# Patient Record
Sex: MALE | Race: WHITE | NOT HISPANIC OR LATINO | Employment: FULL TIME | URBAN - METROPOLITAN AREA
[De-identification: names, ages, dates, MRNs, and addresses within clinical notes are randomized per-mention and may not be internally consistent; named-entity substitution may affect disease eponyms.]

---

## 2019-11-07 ENCOUNTER — OFFICE VISIT (OUTPATIENT)
Dept: FAMILY MEDICINE CLINIC | Facility: CLINIC | Age: 24
End: 2019-11-07
Payer: COMMERCIAL

## 2019-11-07 VITALS
DIASTOLIC BLOOD PRESSURE: 84 MMHG | HEIGHT: 69 IN | BODY MASS INDEX: 34.66 KG/M2 | HEART RATE: 88 BPM | TEMPERATURE: 98.4 F | SYSTOLIC BLOOD PRESSURE: 150 MMHG | WEIGHT: 234 LBS

## 2019-11-07 DIAGNOSIS — Z13.220 ENCOUNTER FOR SCREENING FOR LIPID DISORDER: ICD-10-CM

## 2019-11-07 DIAGNOSIS — R03.0 ELEVATED BP WITHOUT DIAGNOSIS OF HYPERTENSION: ICD-10-CM

## 2019-11-07 DIAGNOSIS — Z71.6 ENCOUNTER FOR SMOKING CESSATION COUNSELING: ICD-10-CM

## 2019-11-07 DIAGNOSIS — J45.909 UNCOMPLICATED ASTHMA, UNSPECIFIED ASTHMA SEVERITY, UNSPECIFIED WHETHER PERSISTENT: Primary | ICD-10-CM

## 2019-11-07 DIAGNOSIS — Z00.00 WELL ADULT EXAM: ICD-10-CM

## 2019-11-07 PROCEDURE — 99203 OFFICE O/P NEW LOW 30 MIN: CPT | Performed by: FAMILY MEDICINE

## 2019-11-07 RX ORDER — VARENICLINE TARTRATE 25 MG
KIT ORAL
Qty: 53 TABLET | Refills: 0 | Status: SHIPPED | OUTPATIENT
Start: 2019-11-07 | End: 2020-03-02

## 2019-11-07 RX ORDER — ALBUTEROL SULFATE 90 UG/1
2 AEROSOL, METERED RESPIRATORY (INHALATION) EVERY 6 HOURS PRN
Qty: 3 INHALER | Refills: 3 | Status: SHIPPED | OUTPATIENT
Start: 2019-11-07

## 2019-11-07 NOTE — PROGRESS NOTES
BMI Counseling: Body mass index is 35 06 kg/m²  The BMI is above normal  Nutrition recommendations include decreasing portion sizes, encouraging healthy choices of fruits and vegetables, decreasing fast food intake, consuming healthier snacks, limiting drinks that contain sugar, moderation in carbohydrate intake, increasing intake of lean protein, reducing intake of saturated and trans fat and reducing intake of cholesterol  Exercise recommendations include moderate physical activity 150 minutes/week  No pharmacotherapy was ordered  Tobacco Cessation Counseling: Tobacco cessation counseling was provided  The patient is sincerely urged to quit consumption of tobacco  He is ready to quit tobacco  Medication options and side effects of medication discussed  Patient agreed to medication  Varenicline (chantix) was prescribed  Assessment/Plan:     Chronic Problems:  No problem-specific Assessment & Plan notes found for this encounter  Visit Diagnosis:  Diagnoses and all orders for this visit:    Uncomplicated asthma, unspecified asthma severity, unspecified whether persistent  -     Complete PFT with post bronchodilator; Future  -     albuterol (PROVENTIL HFA,VENTOLIN HFA) 90 mcg/act inhaler; Inhale 2 puffs every 6 (six) hours as needed for wheezing or shortness of breath    Encounter for smoking cessation counseling  -     varenicline (CHANTIX JOHNNIE) 0 5 MG X 11 & 1 MG X 42 tablet; Take one 0 5mg tab by mouth 1x daily for 3 days, then increase to one 0 5mg tab 2x daily for 3 days, then increase to one 1mg tab 2x daily    Elevated BP without diagnosis of hypertension  -     Comprehensive metabolic panel; Future  -     TSH, 3rd generation; Future    Well adult exam  -     Comprehensive metabolic panel; Future  -     TSH, 3rd generation; Future  -     Lipid panel; Future    Encounter for screening for lipid disorder  -     Lipid panel;  Future     Elevated blood pressure no previous history of hypertension  Discussed values, reviewed goals of blood pressure, encourage low salt sodium diet and weight loss program, encouraged to monitor home blood  Smoking cessation  Discussed plan of care, reviewed medications dosing and timing, reviewed side effect profile, discussed setting quit date 1 week after start medication  Call for any issues concerns  Asthma  Discussed step plan care, has not had recent pulmonary function test recommended in schedule      Subjective:    Patient ID: Vick Bazan is a 25 y o  male  Establish     Here for pe     No new problem s  But has been having a bit of cough has not been using inhaler because I do not have 1  Diet is poor most of the days but does go to the gym  Would like to have thyroid checked  N would like to have prescription for Chantix again for smoking, has tried in the past found it effective  Negative chest pain palpitations shortness of breath difficulty breathing cough lesions rash        Past meds   Asthma   Has not required any inhaler in a number of yrs   shx    none   fam hx  Father 62 yr , a/w  Mother 55yr  Thyroid   Brother older? Soc hx   Mother lives with me   Single   Smoke + 3/4 ppd x 8 yr   [de-identified] -  etoh + occassionally    [employer]   [employer] and [employer]    meds  rx-  Supplement-        The following portions of the patient's history were reviewed and updated as appropriate: allergies, current medications, past family history, past medical history, past social history, past surgical history and problem list     Review of Systems   Constitutional: Negative for appetite change, chills, fever and unexpected weight change  HENT: Negative for congestion, dental problem, ear pain, hearing loss, postnasal drip, rhinorrhea, sinus pressure, sinus pain, sneezing, sore throat, tinnitus and voice change  Eyes: Negative for visual disturbance  Respiratory: Negative for apnea, cough, chest tightness and shortness of breath  Cardiovascular: Negative for chest pain, palpitations and leg swelling  Gastrointestinal: Negative for abdominal pain, blood in stool, constipation, diarrhea, nausea and vomiting  Endocrine: Negative for cold intolerance, heat intolerance, polydipsia, polyphagia and polyuria  Genitourinary: Negative for decreased urine volume, difficulty urinating, dysuria, frequency and hematuria  Musculoskeletal: Negative for arthralgias, back pain, gait problem, joint swelling and myalgias  Skin: Negative for color change, rash and wound  Allergic/Immunologic: Negative for environmental allergies and food allergies  Neurological: Negative for dizziness, syncope, weakness, light-headedness, numbness and headaches  Hematological: Negative for adenopathy  Does not bruise/bleed easily  Psychiatric/Behavioral: Negative for sleep disturbance and suicidal ideas  The patient is not nervous/anxious            /84   Pulse 88   Temp 98 4 °F (36 9 °C)   Ht 5' 8 5" (1 74 m)   Wt 106 kg (234 lb)   BMI 35 06 kg/m²   Social History     Socioeconomic History    Marital status: Single     Spouse name: Not on file    Number of children: Not on file    Years of education: Not on file    Highest education level: Not on file   Occupational History    Not on file   Social Needs    Financial resource strain: Not on file    Food insecurity:     Worry: Not on file     Inability: Not on file    Transportation needs:     Medical: Not on file     Non-medical: Not on file   Tobacco Use    Smoking status: Current Every Day Smoker     Types: Cigarettes    Smokeless tobacco: Never Used   Substance and Sexual Activity    Alcohol use: Yes     Comment: Occasionally     Drug use: Not on file    Sexual activity: Not on file   Lifestyle    Physical activity:     Days per week: Not on file     Minutes per session: Not on file    Stress: Not on file   Relationships    Social connections:     Talks on phone: Not on file Gets together: Not on file     Attends Druze service: Not on file     Active member of club or organization: Not on file     Attends meetings of clubs or organizations: Not on file     Relationship status: Not on file    Intimate partner violence:     Fear of current or ex partner: Not on file     Emotionally abused: Not on file     Physically abused: Not on file     Forced sexual activity: Not on file   Other Topics Concern    Not on file   Social History Narrative    Always uses seat belt    Daily caffeinated coffee consumption    Employed    Exercises regularly    Seeing a dentist     No past medical history on file  No family history on file  No past surgical history on file  Current Outpatient Medications:     albuterol (PROVENTIL HFA,VENTOLIN HFA) 90 mcg/act inhaler, Inhale 2 puffs every 6 (six) hours as needed for wheezing or shortness of breath, Disp: 3 Inhaler, Rfl: 3    varenicline (CHANTIX JOHNNIE) 0 5 MG X 11 & 1 MG X 42 tablet, Take one 0 5mg tab by mouth 1x daily for 3 days, then increase to one 0 5mg tab 2x daily for 3 days, then increase to one 1mg tab 2x daily, Disp: 53 tablet, Rfl: 0    No Known Allergies       Lab Review   No visits with results within 6 Month(s) from this visit  Latest known visit with results is:   No results found for any previous visit  Imaging: No results found  Objective:     Physical Exam   Constitutional: He is oriented to person, place, and time  He appears well-developed and well-nourished  No distress  HENT:   Head: Normocephalic and atraumatic  Right Ear: External ear normal    Left Ear: External ear normal    Mouth/Throat: Oropharynx is clear and moist    Eyes: EOM are normal    Neck: Normal range of motion  Neck supple  Cardiovascular: Normal rate, regular rhythm and normal heart sounds  Pulmonary/Chest: Effort normal and breath sounds normal  He has no wheezes  Abdominal: Soft   Bowel sounds are normal    Musculoskeletal: Normal range of motion  Neurological: He is alert and oriented to person, place, and time  He has normal reflexes  Skin: Skin is warm and dry  Psychiatric: He has a normal mood and affect  His behavior is normal  Judgment and thought content normal          There are no Patient Instructions on file for this visit  CHERYL Sellers    Portions of the record may have been created with voice recognition software  Occasional wrong word or "sound a like" substitutions may have occurred due to the inherent limitations of voice recognition software  Read the chart carefully and recognize, using context, where substitutions have occurred

## 2019-11-22 ENCOUNTER — APPOINTMENT (OUTPATIENT)
Dept: LAB | Facility: HOSPITAL | Age: 24
End: 2019-11-22
Payer: COMMERCIAL

## 2019-11-22 DIAGNOSIS — Z00.00 WELL ADULT EXAM: ICD-10-CM

## 2019-11-22 DIAGNOSIS — Z13.220 ENCOUNTER FOR SCREENING FOR LIPID DISORDER: ICD-10-CM

## 2019-11-22 DIAGNOSIS — R03.0 ELEVATED BP WITHOUT DIAGNOSIS OF HYPERTENSION: ICD-10-CM

## 2019-11-22 LAB
ALBUMIN SERPL BCP-MCNC: 3.9 G/DL (ref 3.5–5)
ALP SERPL-CCNC: 56 U/L (ref 46–116)
ALT SERPL W P-5'-P-CCNC: 27 U/L (ref 12–78)
ANION GAP SERPL CALCULATED.3IONS-SCNC: 8 MMOL/L (ref 4–13)
AST SERPL W P-5'-P-CCNC: 19 U/L (ref 5–45)
BILIRUB SERPL-MCNC: 0.6 MG/DL (ref 0.2–1)
BUN SERPL-MCNC: 10 MG/DL (ref 5–25)
CALCIUM SERPL-MCNC: 9.2 MG/DL (ref 8.3–10.1)
CHLORIDE SERPL-SCNC: 106 MMOL/L (ref 100–108)
CHOLEST SERPL-MCNC: 192 MG/DL (ref 50–200)
CO2 SERPL-SCNC: 29 MMOL/L (ref 21–32)
CREAT SERPL-MCNC: 0.97 MG/DL (ref 0.6–1.3)
GFR SERPL CREATININE-BSD FRML MDRD: 109 ML/MIN/1.73SQ M
GLUCOSE P FAST SERPL-MCNC: 95 MG/DL (ref 65–99)
HDLC SERPL-MCNC: 52 MG/DL
LDLC SERPL CALC-MCNC: 130 MG/DL (ref 0–100)
NONHDLC SERPL-MCNC: 140 MG/DL
POTASSIUM SERPL-SCNC: 4.5 MMOL/L (ref 3.5–5.3)
PROT SERPL-MCNC: 7.1 G/DL (ref 6.4–8.2)
SODIUM SERPL-SCNC: 143 MMOL/L (ref 136–145)
TRIGL SERPL-MCNC: 50 MG/DL
TSH SERPL DL<=0.05 MIU/L-ACNC: 2.02 UIU/ML (ref 0.36–3.74)

## 2019-11-22 PROCEDURE — 36415 COLL VENOUS BLD VENIPUNCTURE: CPT

## 2019-11-22 PROCEDURE — 84443 ASSAY THYROID STIM HORMONE: CPT

## 2019-11-22 PROCEDURE — 80061 LIPID PANEL: CPT

## 2019-11-22 PROCEDURE — 80053 COMPREHEN METABOLIC PANEL: CPT

## 2019-12-09 ENCOUNTER — OFFICE VISIT (OUTPATIENT)
Dept: FAMILY MEDICINE CLINIC | Facility: CLINIC | Age: 24
End: 2019-12-09
Payer: COMMERCIAL

## 2019-12-09 VITALS
TEMPERATURE: 98.4 F | SYSTOLIC BLOOD PRESSURE: 130 MMHG | DIASTOLIC BLOOD PRESSURE: 68 MMHG | WEIGHT: 230 LBS | HEART RATE: 71 BPM | OXYGEN SATURATION: 95 % | HEIGHT: 69 IN | RESPIRATION RATE: 12 BRPM | BODY MASS INDEX: 34.07 KG/M2

## 2019-12-09 DIAGNOSIS — F17.200 SMOKING: ICD-10-CM

## 2019-12-09 DIAGNOSIS — E78.2 MIXED HYPERLIPIDEMIA: Primary | ICD-10-CM

## 2019-12-09 DIAGNOSIS — Z11.4 SCREENING FOR HIV WITHOUT PRESENCE OF RISK FACTORS: ICD-10-CM

## 2019-12-09 PROCEDURE — 99214 OFFICE O/P EST MOD 30 MIN: CPT | Performed by: FAMILY MEDICINE

## 2019-12-09 PROCEDURE — 4004F PT TOBACCO SCREEN RCVD TLK: CPT | Performed by: FAMILY MEDICINE

## 2019-12-09 PROCEDURE — 3008F BODY MASS INDEX DOCD: CPT | Performed by: FAMILY MEDICINE

## 2019-12-09 NOTE — PROGRESS NOTES
Depression Screening and Follow-up Plan: Clincally patient does not have depression  No treatment is required  Tobacco Cessation Counseling: Tobacco cessation counseling was provided  The patient is sincerely urged to quit consumption of tobacco  He is not ready to quit tobacco  Medication options discussed  Assessment/Plan:     Chronic Problems:  No problem-specific Assessment & Plan notes found for this encounter  Visit Diagnosis:  Diagnoses and all orders for this visit:    Mixed hyperlipidemia  -     Lipid panel; Future  -     Comprehensive metabolic panel; Future    Smoking    Screening for HIV without presence of risk factors  -     HIV 1/2 AG-AB combo; Future       hyperlipidemia   discussed care at length discussed dietary modifications, encouraged increasing fiber consider Omega threes, decrease fast foods, encouraged more aerobic exercise continue gym   recheck 6 months   S smoking cessation   discussed at length alternatives potential medications, long-term benefits of nonsmoking    Subjective:    Patient ID: Galen Capone is a 25 y o  male  Here to follow up on blood work   denies any current problems  Cholesterol has not been watching diet, regularly goes to the gym   currently on no medications   Has not consider any dietary modifications   alcohol   smoking continues to smoke packs per day   not ready to quit  Negative chest pain palpitations shortness of breath difficulty breathing cough lesions rash        The following portions of the patient's history were reviewed and updated as appropriate: allergies, current medications, past family history, past medical history, past social history, past surgical history and problem list     Review of Systems   Constitutional: Negative for appetite change, chills, fever and unexpected weight change     HENT: Negative for congestion, dental problem, ear pain, hearing loss, postnasal drip, rhinorrhea, sinus pressure, sinus pain, sneezing, sore throat, tinnitus and voice change  Eyes: Negative for visual disturbance  Respiratory: Negative for apnea, cough, chest tightness and shortness of breath  Cardiovascular: Negative for chest pain, palpitations and leg swelling  Gastrointestinal: Negative for abdominal pain, blood in stool, constipation, diarrhea, nausea and vomiting  Endocrine: Negative for cold intolerance, heat intolerance, polydipsia, polyphagia and polyuria  Genitourinary: Negative for decreased urine volume, difficulty urinating, dysuria, frequency and hematuria  Musculoskeletal: Negative for arthralgias, back pain, gait problem, joint swelling and myalgias  Skin: Negative for color change, rash and wound  Allergic/Immunologic: Negative for environmental allergies and food allergies  Neurological: Negative for dizziness, syncope, weakness, light-headedness, numbness and headaches  Hematological: Negative for adenopathy  Does not bruise/bleed easily  Psychiatric/Behavioral: Negative for sleep disturbance and suicidal ideas  The patient is not nervous/anxious            /68   Pulse 71   Temp 98 4 °F (36 9 °C)   Resp 12   Ht 5' 8 5" (1 74 m)   Wt 104 kg (230 lb)   SpO2 95%   BMI 34 46 kg/m²   Social History     Socioeconomic History    Marital status: Single     Spouse name: Not on file    Number of children: Not on file    Years of education: Not on file    Highest education level: Not on file   Occupational History    Not on file   Social Needs    Financial resource strain: Not on file    Food insecurity:     Worry: Not on file     Inability: Not on file    Transportation needs:     Medical: Not on file     Non-medical: Not on file   Tobacco Use    Smoking status: Current Every Day Smoker     Types: Cigarettes    Smokeless tobacco: Never Used   Substance and Sexual Activity    Alcohol use: Yes     Comment: Occasionally     Drug use: Not on file    Sexual activity: Not on file   Lifestyle    Physical activity:     Days per week: Not on file     Minutes per session: Not on file    Stress: Not on file   Relationships    Social connections:     Talks on phone: Not on file     Gets together: Not on file     Attends Taoism service: Not on file     Active member of club or organization: Not on file     Attends meetings of clubs or organizations: Not on file     Relationship status: Not on file    Intimate partner violence:     Fear of current or ex partner: Not on file     Emotionally abused: Not on file     Physically abused: Not on file     Forced sexual activity: Not on file   Other Topics Concern    Not on file   Social History Narrative    Always uses seat belt    Daily caffeinated coffee consumption    Employed    Exercises regularly    Seeing a dentist     No past medical history on file  No family history on file  No past surgical history on file      Current Outpatient Medications:     albuterol (PROVENTIL HFA,VENTOLIN HFA) 90 mcg/act inhaler, Inhale 2 puffs every 6 (six) hours as needed for wheezing or shortness of breath, Disp: 3 Inhaler, Rfl: 3    varenicline (CHANTIX JOHNNIE) 0 5 MG X 11 & 1 MG X 42 tablet, Take one 0 5mg tab by mouth 1x daily for 3 days, then increase to one 0 5mg tab 2x daily for 3 days, then increase to one 1mg tab 2x daily (Patient not taking: Reported on 12/9/2019), Disp: 53 tablet, Rfl: 0    No Known Allergies       Lab Review   Appointment on 11/22/2019   Component Date Value    Sodium 11/22/2019 143     Potassium 11/22/2019 4 5     Chloride 11/22/2019 106     CO2 11/22/2019 29     ANION GAP 11/22/2019 8     BUN 11/22/2019 10     Creatinine 11/22/2019 0 97     Glucose, Fasting 11/22/2019 95     Calcium 11/22/2019 9 2     AST 11/22/2019 19     ALT 11/22/2019 27     Alkaline Phosphatase 11/22/2019 56     Total Protein 11/22/2019 7 1     Albumin 11/22/2019 3 9     Total Bilirubin 11/22/2019 0 60     eGFR 11/22/2019 109     TSH 3RD GENERATON 11/22/2019 2 024     Cholesterol 11/22/2019 192     Triglycerides 11/22/2019 50     HDL, Direct 11/22/2019 52     LDL Calculated 11/22/2019 130*    Non-HDL-Chol (CHOL-HDL) 11/22/2019 140         Imaging: No results found  Objective:     Physical Exam   Constitutional: He is oriented to person, place, and time  He appears well-developed and well-nourished  No distress  HENT:   Head: Normocephalic and atraumatic  Right Ear: External ear normal    Left Ear: External ear normal    Mouth/Throat: Oropharynx is clear and moist  No oropharyngeal exudate  Eyes: Conjunctivae are normal    Neck: Normal range of motion  Neck supple  Cardiovascular: Normal rate, regular rhythm and normal heart sounds  Pulmonary/Chest: Effort normal and breath sounds normal    Musculoskeletal: Normal range of motion  Lymphadenopathy:     He has no cervical adenopathy  Neurological: He is alert and oriented to person, place, and time  He has normal reflexes  Skin: Skin is warm and dry  Psychiatric: He has a normal mood and affect  His behavior is normal  Judgment and thought content normal          There are no Patient Instructions on file for this visit  CHERYL Carlisle    Portions of the record may have been created with voice recognition software  Occasional wrong word or "sound a like" substitutions may have occurred due to the inherent limitations of voice recognition software  Read the chart carefully and recognize, using context, where substitutions have occurred

## 2020-03-01 ENCOUNTER — HOSPITAL ENCOUNTER (EMERGENCY)
Facility: HOSPITAL | Age: 25
Discharge: HOME | End: 2020-03-01
Attending: EMERGENCY MEDICINE | Admitting: EMERGENCY MEDICINE
Payer: COMMERCIAL

## 2020-03-01 ENCOUNTER — APPOINTMENT (EMERGENCY)
Dept: RADIOLOGY | Facility: HOSPITAL | Age: 25
End: 2020-03-01
Payer: COMMERCIAL

## 2020-03-01 VITALS
RESPIRATION RATE: 16 BRPM | TEMPERATURE: 97.9 F | WEIGHT: 229.28 LBS | SYSTOLIC BLOOD PRESSURE: 141 MMHG | BODY MASS INDEX: 34.35 KG/M2 | DIASTOLIC BLOOD PRESSURE: 79 MMHG | OXYGEN SATURATION: 99 % | HEART RATE: 71 BPM

## 2020-03-01 DIAGNOSIS — S52.502A FRACTURE OF RADIUS AND ULNA, DISTAL, LEFT, CLOSED, INITIAL ENCOUNTER: Primary | ICD-10-CM

## 2020-03-01 DIAGNOSIS — S52.602A FRACTURE OF RADIUS AND ULNA, DISTAL, LEFT, CLOSED, INITIAL ENCOUNTER: Primary | ICD-10-CM

## 2020-03-01 PROCEDURE — 99283 EMERGENCY DEPT VISIT LOW MDM: CPT

## 2020-03-01 PROCEDURE — 99284 EMERGENCY DEPT VISIT MOD MDM: CPT | Performed by: PHYSICIAN ASSISTANT

## 2020-03-01 PROCEDURE — 73110 X-RAY EXAM OF WRIST: CPT

## 2020-03-01 PROCEDURE — 29125 APPL SHORT ARM SPLINT STATIC: CPT | Performed by: PHYSICIAN ASSISTANT

## 2020-03-01 RX ORDER — ACETAMINOPHEN 325 MG/1
650 TABLET ORAL ONCE
Status: COMPLETED | OUTPATIENT
Start: 2020-03-01 | End: 2020-03-01

## 2020-03-01 RX ORDER — IBUPROFEN 600 MG/1
600 TABLET ORAL EVERY 6 HOURS PRN
Qty: 30 TABLET | Refills: 0 | Status: SHIPPED | OUTPATIENT
Start: 2020-03-01 | End: 2020-04-30

## 2020-03-01 RX ADMIN — ACETAMINOPHEN 650 MG: 325 TABLET, FILM COATED ORAL at 14:59

## 2020-03-01 NOTE — ED PROVIDER NOTES
History  Chief Complaint   Patient presents with    Fall     Patient fell 8feet off of a ladder and landed on the ground  Patient c/o left wrist injury  Patiend denies loc  Patient denies head injury  No blood thinners  17-year-old male patient here for evaluation left wrist injury  Occurred immediately prior to arrival   States he was on a ladder when he fell off  Approximately 8 ft up  Landed on outstretched left hand  Complaining of left wrist pain  He has no other injuries, denies headache head injury, neck pain or stiffness, or other extremity injury  No torso injury  He is not on anticoagulants or antiplatelets  He is right-hand dominant  History provided by:  Patient   used: No    Fall   Mechanism of injury: fall    Injury location:  Hand  Hand injury location:  L wrist  Incident location:  Outdoors  Time since incident:  2 hours  Arrived directly from scene: yes    Fall:     Fall occurred:  From a ladder    Height of fall:  8ft    Impact surface:  Hard floor    Point of impact:  Outstretched arms    Entrapped after fall: no    Protective equipment: none    Suspicion of alcohol use: no    Suspicion of drug use: no    Tetanus status:  Unknown  Prior to arrival data:     Bystander interventions:  None  Associated symptoms: no abdominal pain, no back pain, no headaches, no hearing loss, no nausea, no neck pain and no vomiting        Prior to Admission Medications   Prescriptions Last Dose Informant Patient Reported? Taking?    albuterol (PROVENTIL HFA,VENTOLIN HFA) 90 mcg/act inhaler   No Yes   Sig: Inhale 2 puffs every 6 (six) hours as needed for wheezing or shortness of breath   varenicline (CHANTIX JOHNNIE) 0 5 MG X 11 & 1 MG X 42 tablet Not Taking at Unknown time  No No   Sig: Take one 0 5mg tab by mouth 1x daily for 3 days, then increase to one 0 5mg tab 2x daily for 3 days, then increase to one 1mg tab 2x daily   Patient not taking: Reported on 12/9/2019 Facility-Administered Medications: None       History reviewed  No pertinent past medical history  History reviewed  No pertinent surgical history  History reviewed  No pertinent family history  I have reviewed and agree with the history as documented  E-Cigarette/Vaping     E-Cigarette/Vaping Substances     Social History     Tobacco Use    Smoking status: Current Every Day Smoker     Types: Cigarettes    Smokeless tobacco: Never Used   Substance Use Topics    Alcohol use: Yes     Comment: Occasionally     Drug use: Never       Review of Systems   Constitutional: Negative  HENT: Negative for dental problem, ear pain, hearing loss, nosebleeds, tinnitus and trouble swallowing  Eyes: Negative for photophobia, pain and visual disturbance  Respiratory: Negative for apnea, cough, choking, chest tightness, shortness of breath, wheezing and stridor  Gastrointestinal: Negative for abdominal pain, nausea and vomiting  Genitourinary: Negative for decreased urine volume and enuresis  Musculoskeletal: Negative for back pain, myalgias, neck pain and neck stiffness  Left wrist injury   Skin: Negative for pallor, rash and wound  Allergic/Immunologic: Negative  Neurological: Negative for dizziness, syncope, speech difficulty, weakness, light-headedness, numbness and headaches  Physical Exam  Physical Exam   Constitutional: He is oriented to person, place, and time  He appears well-developed and well-nourished  Non-toxic appearance  He does not have a sickly appearance  He does not appear ill  No distress  HENT:   Head: Normocephalic and atraumatic  Eyes: Pupils are equal, round, and reactive to light  EOM and lids are normal    Neck: Normal range of motion  Neck supple  Cardiovascular: Normal rate, regular rhythm, S1 normal, S2 normal, normal heart sounds, intact distal pulses and normal pulses   Exam reveals no gallop, no distant heart sounds, no friction rub and no decreased pulses  No murmur heard  Pulses:       Radial pulses are 2+ on the right side, and 2+ on the left side  Pulmonary/Chest: Effort normal and breath sounds normal  No accessory muscle usage  No apnea, no tachypnea and no bradypnea  No respiratory distress  He has no decreased breath sounds  He has no wheezes  He has no rhonchi  He has no rales  Abdominal: Soft  Normal appearance  He exhibits no distension  There is no tenderness  There is no rigidity, no rebound and no guarding  Musculoskeletal: He exhibits no edema  Left wrist: He exhibits decreased range of motion, tenderness, bony tenderness and deformity  He exhibits no swelling, no effusion, no crepitus and no laceration   strength 5/5 left hand  Normal sensation  2+ radial pulse    Neurological: He is alert and oriented to person, place, and time  No cranial nerve deficit  GCS eye subscore is 4  GCS verbal subscore is 5  GCS motor subscore is 6  GCS 15  AAOx3  Ambulating in department without difficulty  CN II-XII grossly intact  No focal neuro deficits  Skin: Skin is warm, dry and intact  No rash noted  He is not diaphoretic  No erythema  No pallor  Psychiatric: His speech is normal    Nursing note and vitals reviewed        Vital Signs  ED Triage Vitals [03/01/20 1347]   Temperature Pulse Respirations Blood Pressure SpO2   97 9 °F (36 6 °C) 70 16 155/82 100 %      Temp Source Heart Rate Source Patient Position - Orthostatic VS BP Location FiO2 (%)   Oral Monitor Sitting Right arm --      Pain Score       8           Vitals:    03/01/20 1347 03/01/20 1615   BP: 155/82 141/79   Pulse: 70 71   Patient Position - Orthostatic VS: Sitting Sitting         Visual Acuity  Visual Acuity      Most Recent Value   L Pupil Size (mm)  3   R Pupil Size (mm)  3          ED Medications  Medications   acetaminophen (TYLENOL) tablet 650 mg (650 mg Oral Given 3/1/20 1459)       Diagnostic Studies  Results Reviewed     None                 XR wrist 3+ views LEFT   ED Interpretation by Caren Weinstein PA-C (03/01 1556)   Radial and ulnar fractures                 Procedures  Splint application  Date/Time: 3/1/2020 4:49 PM  Performed by: Caren Weinstein PA-C  Authorized by: Caren Weinstein PA-C     Patient location:  ED  Performing Provider:  Tech  Other Assisting Provider: Yes (comment)    Consent:     Consent obtained:  Verbal    Consent given by:  Patient    Risks discussed:  Discoloration, numbness, pain and swelling    Alternatives discussed:  No treatment  Universal protocol:     Procedure explained and questions answered to patient or proxy's satisfaction: yes      Patient identity confirmed:  Arm band, hospital-assigned identification number and verbally with patient  Indication:     Indications: fracture    Pre-procedure details:     Sensation:  Normal  Procedure details:     Splint type:  Sugar tong    Supplies:  Ortho-Glass and sling  Post-procedure details:     Pain:  Unchanged    Sensation:  Normal    Neurovascular Exam: skin pink, capillary refill <2 sec, normal pulses and skin intact, warm, and dry      Patient tolerance of procedure: Tolerated well, no immediate complications             ED Course                               MDM  Number of Diagnoses or Management Options  Fracture of radius and ulna, distal, left, closed, initial encounter: new and requires workup  Diagnosis management comments: Differential diagnosis including but not limited to: sprain, strain, fracture, dislocation, contusion  Plan: XR  dispo pending  Amount and/or Complexity of Data Reviewed  Tests in the radiology section of CPT®: ordered and reviewed  Independent visualization of images, tracings, or specimens: yes    Risk of Complications, Morbidity, and/or Mortality  Presenting problems: low  Management options: low  General comments: 21 yo male here for left wrist injury  Found to have fracture of distal radius and ulna  Normal pulse  Normal strength   Normal sensation  No acute neuro deficit  Patient placed in splint and given sling  Follow up ortho tomorrow or Tuesday for definitive care  Return parameters provided  Pt understands and agrees with plan  Offered percocet for home analgesia which was declined by patient  He would prefer motrin  Patient Progress  Patient progress: stable        Disposition  Final diagnoses:   Fracture of radius and ulna, distal, left, closed, initial encounter     Time reflects when diagnosis was documented in both MDM as applicable and the Disposition within this note     Time User Action Codes Description Comment    3/1/2020  3:31 PM Orlando Guevara Add [K88 531T,  S55 [id number]] Fracture of radius and ulna, distal, left, closed, initial encounter       ED Disposition     ED Disposition Condition Date/Time Comment    Discharge Stable Sun Mar 1, 2020  3:30 PM Emperatriz Prado discharge to home/self care              Follow-up Information     Follow up With Specialties Details Why Contact Info Additional Information    [address] [facility] [id number] [address] Orthopedic Surgery  Monday [facility]  [address] [id number]  [address] [facility] [address] [address], [place] [address], [address] [address], [address] [facility]    [address] [facility] Orthopedic Surgery  Tuesday (nothing to eat at midnight) 36 [facility] 42 [facility] 113 [facility], [address] [address] [place], [place], [facility]          Discharge Medication List as of 3/1/2020  3:35 PM      START taking these medications    Details   ibuprofen (MOTRIN) 600 mg tablet Take 1 tablet (600 mg total) by mouth every 6 (six) hours as needed for mild pain, Starting Sun 3/1/2020, Print         CONTINUE these medications which have NOT CHANGED    Details albuterol (PROVENTIL HFA,VENTOLIN HFA) 90 mcg/act inhaler Inhale 2 puffs every 6 (six) hours as needed for wheezing or shortness of breath, Starting u 11/7/2019, Normal      varenicline (CHANTIX JOHNNIE) 0 5 MG X 11 & 1 MG X 42 tablet Take one 0 5mg tab by mouth 1x daily for 3 days, then increase to one 0 5mg tab 2x daily for 3 days, then increase to one 1mg tab 2x daily, Normal               PDMP Review     None          ED Provider  Electronically Signed by           Nely Buchanan PA-C  03/01/20 6465

## 2020-03-02 ENCOUNTER — APPOINTMENT (OUTPATIENT)
Dept: LAB | Facility: MEDICAL CENTER | Age: 25
End: 2020-03-02
Payer: COMMERCIAL

## 2020-03-02 ENCOUNTER — PREP FOR PROCEDURE (OUTPATIENT)
Dept: OBGYN CLINIC | Facility: MEDICAL CENTER | Age: 25
End: 2020-03-02

## 2020-03-02 ENCOUNTER — OFFICE VISIT (OUTPATIENT)
Dept: OBGYN CLINIC | Facility: MEDICAL CENTER | Age: 25
End: 2020-03-02
Payer: COMMERCIAL

## 2020-03-02 ENCOUNTER — TELEPHONE (OUTPATIENT)
Dept: OBGYN CLINIC | Facility: CLINIC | Age: 25
End: 2020-03-02

## 2020-03-02 VITALS
SYSTOLIC BLOOD PRESSURE: 128 MMHG | WEIGHT: 217.2 LBS | HEART RATE: 68 BPM | HEIGHT: 70 IN | DIASTOLIC BLOOD PRESSURE: 80 MMHG | BODY MASS INDEX: 31.09 KG/M2

## 2020-03-02 DIAGNOSIS — S52.501A CLOSED FRACTURE DISTAL RADIUS AND ULNA, RIGHT, INITIAL ENCOUNTER: ICD-10-CM

## 2020-03-02 DIAGNOSIS — Z01.812 PRE-OPERATIVE LABORATORY EXAMINATION: ICD-10-CM

## 2020-03-02 DIAGNOSIS — S52.601A CLOSED FRACTURE DISTAL RADIUS AND ULNA, RIGHT, INITIAL ENCOUNTER: Primary | ICD-10-CM

## 2020-03-02 DIAGNOSIS — Z01.812 PRE-OPERATIVE LABORATORY EXAMINATION: Primary | ICD-10-CM

## 2020-03-02 DIAGNOSIS — S52.601A CLOSED FRACTURE DISTAL RADIUS AND ULNA, RIGHT, INITIAL ENCOUNTER: ICD-10-CM

## 2020-03-02 DIAGNOSIS — S52.501A CLOSED FRACTURE DISTAL RADIUS AND ULNA, RIGHT, INITIAL ENCOUNTER: Primary | ICD-10-CM

## 2020-03-02 PROBLEM — S52.502A CLOSED FRACTURE OF LEFT DISTAL RADIUS AND ULNA: Status: ACTIVE | Noted: 2020-03-02

## 2020-03-02 PROBLEM — S52.602A CLOSED FRACTURE OF LEFT DISTAL RADIUS AND ULNA: Status: ACTIVE | Noted: 2020-03-02

## 2020-03-02 LAB
ANION GAP SERPL CALCULATED.3IONS-SCNC: 2 MMOL/L (ref 4–13)
BASOPHILS # BLD AUTO: 0.02 THOUSANDS/ΜL (ref 0–0.1)
BASOPHILS NFR BLD AUTO: 0 % (ref 0–1)
BUN SERPL-MCNC: 15 MG/DL (ref 5–25)
CALCIUM SERPL-MCNC: 8.8 MG/DL (ref 8.3–10.1)
CHLORIDE SERPL-SCNC: 109 MMOL/L (ref 100–108)
CO2 SERPL-SCNC: 29 MMOL/L (ref 21–32)
CREAT SERPL-MCNC: 0.81 MG/DL (ref 0.6–1.3)
EOSINOPHIL # BLD AUTO: 0.06 THOUSAND/ΜL (ref 0–0.61)
EOSINOPHIL NFR BLD AUTO: 1 % (ref 0–6)
ERYTHROCYTE [DISTWIDTH] IN BLOOD BY AUTOMATED COUNT: 14 % (ref 11.6–15.1)
GFR SERPL CREATININE-BSD FRML MDRD: 124 ML/MIN/1.73SQ M
GLUCOSE P FAST SERPL-MCNC: 99 MG/DL (ref 65–99)
HCT VFR BLD AUTO: 44.7 % (ref 36.5–49.3)
HGB BLD-MCNC: 14.3 G/DL (ref 12–17)
IMM GRANULOCYTES # BLD AUTO: 0.03 THOUSAND/UL (ref 0–0.2)
IMM GRANULOCYTES NFR BLD AUTO: 1 % (ref 0–2)
LYMPHOCYTES # BLD AUTO: 1.07 THOUSANDS/ΜL (ref 0.6–4.47)
LYMPHOCYTES NFR BLD AUTO: 18 % (ref 14–44)
MCH RBC QN AUTO: 28 PG (ref 26.8–34.3)
MCHC RBC AUTO-ENTMCNC: 32 G/DL (ref 31.4–37.4)
MCV RBC AUTO: 88 FL (ref 82–98)
MONOCYTES # BLD AUTO: 0.67 THOUSAND/ΜL (ref 0.17–1.22)
MONOCYTES NFR BLD AUTO: 11 % (ref 4–12)
NEUTROPHILS # BLD AUTO: 4.08 THOUSANDS/ΜL (ref 1.85–7.62)
NEUTS SEG NFR BLD AUTO: 69 % (ref 43–75)
NRBC BLD AUTO-RTO: 0 /100 WBCS
PLATELET # BLD AUTO: 274 THOUSANDS/UL (ref 149–390)
PMV BLD AUTO: 8.4 FL (ref 8.9–12.7)
POTASSIUM SERPL-SCNC: 4.2 MMOL/L (ref 3.5–5.3)
RBC # BLD AUTO: 5.1 MILLION/UL (ref 3.88–5.62)
SODIUM SERPL-SCNC: 140 MMOL/L (ref 136–145)
WBC # BLD AUTO: 5.93 THOUSAND/UL (ref 4.31–10.16)

## 2020-03-02 PROCEDURE — 3008F BODY MASS INDEX DOCD: CPT | Performed by: ORTHOPAEDIC SURGERY

## 2020-03-02 PROCEDURE — 85025 COMPLETE CBC W/AUTO DIFF WBC: CPT

## 2020-03-02 PROCEDURE — 4004F PT TOBACCO SCREEN RCVD TLK: CPT | Performed by: ORTHOPAEDIC SURGERY

## 2020-03-02 PROCEDURE — 80048 BASIC METABOLIC PNL TOTAL CA: CPT

## 2020-03-02 PROCEDURE — 99204 OFFICE O/P NEW MOD 45 MIN: CPT | Performed by: ORTHOPAEDIC SURGERY

## 2020-03-02 PROCEDURE — 36415 COLL VENOUS BLD VENIPUNCTURE: CPT

## 2020-03-02 RX ORDER — OXYCODONE HYDROCHLORIDE AND ACETAMINOPHEN 5; 325 MG/1; MG/1
1 TABLET ORAL EVERY 4 HOURS PRN
Qty: 30 TABLET | Refills: 0 | Status: SHIPPED | OUTPATIENT
Start: 2020-03-02 | End: 2020-03-12

## 2020-03-02 RX ORDER — CEFAZOLIN SODIUM 2 G/50ML
2000 SOLUTION INTRAVENOUS ONCE
Status: CANCELLED | OUTPATIENT
Start: 2020-03-02 | End: 2020-03-02

## 2020-03-02 NOTE — LETTER
March 2, 2020     Patient: Vick Bazan   YOB: 1995   Date of Visit: 3/2/2020       To Whom it May Concern:    Vick Bazan is under my professional care  He was seen in my office on 3/2/2020  He may return to work 3/6/2020 with NO use of his left arm  Pt must remain in splint at all times  If you have any questions or concerns, please don't hesitate to call           Sincerely,          Cooper Chavez MD        CC: No Recipients

## 2020-03-02 NOTE — PROGRESS NOTES
CHIEF COMPLAINT:  Chief Complaint   Patient presents with    Left Wrist - Pain       SUBJECTIVE:  Frannie Brizuela is a 22y o  year old RHD male who presents to the office for evaluation of his left wrist   Patient sustained injury to left wrist when he fell 8 ft from a ladder landing on the ground  Patient was seen in the emergency department 03/01/2020 where x-rays were performed and patient was splinted  Pt is taking Ibuprofen for pain  Pt states that he has numbness and tingling oat the tips of his fingers  Pt denies previous injury to his left wrist     The patient denies any cardiac or pulmonary issues  Denies diabetes  Denies any history of MI, gastric ulcers, kidney or liver issues  Denies blood thinners  PAST MEDICAL HISTORY:  History reviewed  No pertinent past medical history  PAST SURGICAL HISTORY:  History reviewed  No pertinent surgical history  FAMILY HISTORY:  History reviewed  No pertinent family history  SOCIAL HISTORY:  Social History     Tobacco Use    Smoking status: Current Every Day Smoker     Types: Cigarettes    Smokeless tobacco: Never Used   Substance Use Topics    Alcohol use: Yes     Comment: Occasionally     Drug use: Never       MEDICATIONS:    Current Outpatient Medications:     ibuprofen (MOTRIN) 600 mg tablet, Take 1 tablet (600 mg total) by mouth every 6 (six) hours as needed for mild pain, Disp: 30 tablet, Rfl: 0    albuterol (PROVENTIL HFA,VENTOLIN HFA) 90 mcg/act inhaler, Inhale 2 puffs every 6 (six) hours as needed for wheezing or shortness of breath, Disp: 3 Inhaler, Rfl: 3    oxyCODONE-acetaminophen (PERCOCET) 5-325 mg per tablet, Take 1 tablet by mouth every 4 (four) hours as needed for moderate pain To be taken after surgeryMax Daily Amount: 6 tablets, Disp: 30 tablet, Rfl: 0  No current facility-administered medications for this visit       ALLERGIES:  No Known Allergies    REVIEW OF SYSTEMS:  Review of Systems   Constitutional: Negative for chills, fever and unexpected weight change  HENT: Negative for hearing loss, nosebleeds and sore throat  Eyes: Negative for pain, redness and visual disturbance  Respiratory: Negative for cough, shortness of breath and wheezing  Cardiovascular: Negative for chest pain, palpitations and leg swelling  Gastrointestinal: Negative for abdominal pain, nausea and vomiting  Endocrine: Negative for polydipsia and polyuria  Genitourinary: Negative for dysuria and hematuria  Skin: Negative for rash and wound  Neurological: Negative for dizziness, light-headedness and headaches  Psychiatric/Behavioral: Negative for decreased concentration, dysphoric mood and suicidal ideas  The patient is not nervous/anxious          VITALS:  Vitals:    03/02/20 0944   BP: 128/80   Pulse: 68       LABS:  HgA1c: No results found for: HGBA1C  BMP:   Lab Results   Component Value Date    CALCIUM 9 2 11/22/2019    K 4 5 11/22/2019    CO2 29 11/22/2019     11/22/2019    BUN 10 11/22/2019    CREATININE 0 97 11/22/2019       _____________________________________________________  PHYSICAL EXAMINATION:  General: well developed and well nourished, alert, oriented times 3 and appears comfortable  Psychiatric: Normal  HEENT: Trachea Midline, No torticollis  Pulmonary: No audible wheezing or strider  Cardiovascular: No discernable arrhythmia   Skin: No masses, erythema, lacerations, fluctation, ulcerations  Neurovascular: Sensation Intact to the Median, Ulnar, Radial Nerve, Motor Intact to the Median, Ulnar, Radial Nerve and Pulses Intact    MUSCULOSKELETAL EXAMINATION:  Left wrist  Fullness at the wrist   Motion not tested due to injury      ___________________________________________________  STUDIES REVIEWED:  X-rays left wrist performed 03/01/2020 show comminuted intra-articular fracture of the distal radius and nondisplaced fracture with dorsal apex angulation of the ulnar styloid process      PROCEDURES PERFORMED:  Procedures  No Procedures performed today    _____________________________________________________  ASSESSMENT/PLAN:  Left distal radius intra articular fracture and ulna styloid fracture 3/1/2020  ORIF left distal radius was discussed at length today including the risks and benefits  Pt understands and wants to proceed  *Surgery- ORIF left distal radius    * detailed consent was signed in the office   * anesthesia- regional with sedation    * antibiotics- ordered    * OT order was placed   * Post op medication was sent to the office on file   * Pt was advised to stop NSAIDs and take the prescribed pain medication   Surgery medication instructions: You will stop eating and drinking at midnight the night before your surgery, but you may continue to take your normal medications with a small sip of water  In the morning on the day of your surgery, we would like you to take the following medications (as long as you have never been told to avoid Tylenol or NSAIDs like ibuprofen, Naproxen, Aleve, Advil, etc):   Ibuprofen 600mg one tablet by mouth   Tylenol 500mg one tablet by mouth    After surgery, we would like you to take Ibuprofen 600mg one tablet by mouth every 6 hours with food (at breakfast, lunch and dinner)  AND Tylenol 500 mg one tablet by mouth every 6 hours  (at breakfast, lunch and dinner) for 5-7 days after your surgery  Please take these medication EVERYDAY after surgery for 5-7 days, and not just as needed  You can take these medications at the same time  Taking these medications after surgery will limit your need for prescription pain medication  We will also prescribe a narcotic pain medication for a limited time after surgery that you can take as needed for moderate or severe pain           Diagnoses and all orders for this visit:    Closed fracture distal radius and ulna, right, initial encounter  -     Case request operating room: OPEN REDUCTION W/ INTERNAL FIXATION (ORIF) RADIUS / ULNA (WRIST) Left; Standing  -     oxyCODONE-acetaminophen (PERCOCET) 5-325 mg per tablet; Take 1 tablet by mouth every 4 (four) hours as needed for moderate pain To be taken after surgeryMax Daily Amount: 6 tablets  -     Case request operating room: OPEN REDUCTION W/ INTERNAL FIXATION (ORIF) RADIUS / ULNA (WRIST) Left  -     Ambulatory referral to PT/OT hand therapy; Future    Other orders  -     Diet NPO; Sips with meds; Standing  -     Height and weight upon arrival; Standing  -     Void on call to OR; Standing  -     Insert peripheral IV; Standing  -     ceFAZolin (ANCEF) IVPB (premix) 2,000 mg        Follow Up:  Return for after surgery  Work/school status:  Provided     To Do Next Visit:  Re-evaluation of current issue xr left wrist       Operative Discussions:  Distal Radius Fracture Fixation: Both operative and non operative management of the injury was explained to the patient  The decision was made to undergo surgical fixation  The surgical procedure was explained with a verbal understanding expressed by the patient  Postoperatively, the patient is to begin with occupational therapy to have a removable splint applied  This splint may be removed for showering, bathing, dining, sedative activities (eg watching tv, reading etc ) and daily therapy exercises  Radiographs are typically taken at intervals throughout the fracture healing ensure maintenance of reduction and alignment  If the fracture loses its alignment, revision surgery may be required  Medical conditions such as diabetes, osteoporosis, vitamin D deficiency, and a history of or exposure to smoking may delay or prevent fracture healing    The risks and benefits of the procedure were explained to the patient, which include, but are not limited to: Bleeding, infection, recurrence, pain, scar, malunion, nonunion, damage to tendons, damage to nerves, and damage to blood vessels, and complications related to anesthesia, failure to give desire result, need for more surgery  These risks, along with alternative conservative treatment options, and postoperative protocols were voiced back and understood by the patient  All questions were answered to the patient's satisfaction  The patient agrees to comply with a standard postoperative protocol, and is willing to proceed  Education was provided via written and auditory forms  There were no barriers to learning  Written handouts regarding wound care, incision and scar care, and general preoperative information was provided to the patient  Prior to surgery, the patient may be requested to stop all anti-inflammatory medications  Prophylactic aspirin, Plavix, and Coumadin may be allowed to be continued  Medications including vitamin E , ginkgo, and fish oil are requested to be stopped approximately one week prior to surgery  Hypertensive medications and beta blockers, if taken, should be continued      Scribe Attestation    I,:   Ronnie Monk am acting as a scribe while in the presence of the attending physician :        I,:   Apple Queen MD personally performed the services described in this documentation    as scribed in my presence :

## 2020-03-02 NOTE — H&P (VIEW-ONLY)
CHIEF COMPLAINT:  Chief Complaint   Patient presents with    Left Wrist - Pain       SUBJECTIVE:  Emperatriz Prado is a 22y o  year old RHD male who presents to the office for evaluation of his left wrist   Patient sustained injury to left wrist when he fell 8 ft from a ladder landing on the ground  Patient was seen in the emergency department 03/01/2020 where x-rays were performed and patient was splinted  Pt is taking Ibuprofen for pain  Pt states that he has numbness and tingling oat the tips of his fingers  Pt denies previous injury to his left wrist     The patient denies any cardiac or pulmonary issues  Denies diabetes  Denies any history of MI, gastric ulcers, kidney or liver issues  Denies blood thinners  PAST MEDICAL HISTORY:  History reviewed  No pertinent past medical history  PAST SURGICAL HISTORY:  History reviewed  No pertinent surgical history  FAMILY HISTORY:  History reviewed  No pertinent family history  SOCIAL HISTORY:  Social History     Tobacco Use    Smoking status: Current Every Day Smoker     Types: Cigarettes    Smokeless tobacco: Never Used   Substance Use Topics    Alcohol use: Yes     Comment: Occasionally     Drug use: Never       MEDICATIONS:    Current Outpatient Medications:     ibuprofen (MOTRIN) 600 mg tablet, Take 1 tablet (600 mg total) by mouth every 6 (six) hours as needed for mild pain, Disp: 30 tablet, Rfl: 0    albuterol (PROVENTIL HFA,VENTOLIN HFA) 90 mcg/act inhaler, Inhale 2 puffs every 6 (six) hours as needed for wheezing or shortness of breath, Disp: 3 Inhaler, Rfl: 3    oxyCODONE-acetaminophen (PERCOCET) 5-325 mg per tablet, Take 1 tablet by mouth every 4 (four) hours as needed for moderate pain To be taken after surgeryMax Daily Amount: 6 tablets, Disp: 30 tablet, Rfl: 0  No current facility-administered medications for this visit       ALLERGIES:  No Known Allergies    REVIEW OF SYSTEMS:  Review of Systems   Constitutional: Negative for chills, fever and unexpected weight change  HENT: Negative for hearing loss, nosebleeds and sore throat  Eyes: Negative for pain, redness and visual disturbance  Respiratory: Negative for cough, shortness of breath and wheezing  Cardiovascular: Negative for chest pain, palpitations and leg swelling  Gastrointestinal: Negative for abdominal pain, nausea and vomiting  Endocrine: Negative for polydipsia and polyuria  Genitourinary: Negative for dysuria and hematuria  Skin: Negative for rash and wound  Neurological: Negative for dizziness, light-headedness and headaches  Psychiatric/Behavioral: Negative for decreased concentration, dysphoric mood and suicidal ideas  The patient is not nervous/anxious          VITALS:  Vitals:    03/02/20 0944   BP: 128/80   Pulse: 68       LABS:  HgA1c: No results found for: HGBA1C  BMP:   Lab Results   Component Value Date    CALCIUM 9 2 11/22/2019    K 4 5 11/22/2019    CO2 29 11/22/2019     11/22/2019    BUN 10 11/22/2019    CREATININE 0 97 11/22/2019       _____________________________________________________  PHYSICAL EXAMINATION:  General: well developed and well nourished, alert, oriented times 3 and appears comfortable  Psychiatric: Normal  HEENT: Trachea Midline, No torticollis  Pulmonary: No audible wheezing or strider  Cardiovascular: No discernable arrhythmia   Skin: No masses, erythema, lacerations, fluctation, ulcerations  Neurovascular: Sensation Intact to the Median, Ulnar, Radial Nerve, Motor Intact to the Median, Ulnar, Radial Nerve and Pulses Intact    MUSCULOSKELETAL EXAMINATION:  Left wrist  Fullness at the wrist   Motion not tested due to injury      ___________________________________________________  STUDIES REVIEWED:  X-rays left wrist performed 03/01/2020 show comminuted intra-articular fracture of the distal radius and nondisplaced fracture with dorsal apex angulation of the ulnar styloid process      PROCEDURES PERFORMED:  Procedures  No Procedures performed today    _____________________________________________________  ASSESSMENT/PLAN:  Left distal radius intra articular fracture and ulna styloid fracture 3/1/2020  ORIF left distal radius was discussed at length today including the risks and benefits  Pt understands and wants to proceed  *Surgery- ORIF left distal radius    * detailed consent was signed in the office   * anesthesia- regional with sedation    * antibiotics- ordered    * OT order was placed   * Post op medication was sent to the office on file   * Pt was advised to stop NSAIDs and take the prescribed pain medication   Surgery medication instructions: You will stop eating and drinking at midnight the night before your surgery, but you may continue to take your normal medications with a small sip of water  In the morning on the day of your surgery, we would like you to take the following medications (as long as you have never been told to avoid Tylenol or NSAIDs like ibuprofen, Naproxen, Aleve, Advil, etc):   Ibuprofen 600mg one tablet by mouth   Tylenol 500mg one tablet by mouth    After surgery, we would like you to take Ibuprofen 600mg one tablet by mouth every 6 hours with food (at breakfast, lunch and dinner)  AND Tylenol 500 mg one tablet by mouth every 6 hours  (at breakfast, lunch and dinner) for 5-7 days after your surgery  Please take these medication EVERYDAY after surgery for 5-7 days, and not just as needed  You can take these medications at the same time  Taking these medications after surgery will limit your need for prescription pain medication  We will also prescribe a narcotic pain medication for a limited time after surgery that you can take as needed for moderate or severe pain           Diagnoses and all orders for this visit:    Closed fracture distal radius and ulna, right, initial encounter  -     Case request operating room: OPEN REDUCTION W/ INTERNAL FIXATION (ORIF) RADIUS / ULNA (WRIST) Left; Standing  -     oxyCODONE-acetaminophen (PERCOCET) 5-325 mg per tablet; Take 1 tablet by mouth every 4 (four) hours as needed for moderate pain To be taken after surgeryMax Daily Amount: 6 tablets  -     Case request operating room: OPEN REDUCTION W/ INTERNAL FIXATION (ORIF) RADIUS / ULNA (WRIST) Left  -     Ambulatory referral to PT/OT hand therapy; Future    Other orders  -     Diet NPO; Sips with meds; Standing  -     Height and weight upon arrival; Standing  -     Void on call to OR; Standing  -     Insert peripheral IV; Standing  -     ceFAZolin (ANCEF) IVPB (premix) 2,000 mg        Follow Up:  Return for after surgery  Work/school status:  Provided     To Do Next Visit:  Re-evaluation of current issue xr left wrist       Operative Discussions:  Distal Radius Fracture Fixation: Both operative and non operative management of the injury was explained to the patient  The decision was made to undergo surgical fixation  The surgical procedure was explained with a verbal understanding expressed by the patient  Postoperatively, the patient is to begin with occupational therapy to have a removable splint applied  This splint may be removed for showering, bathing, dining, sedative activities (eg watching tv, reading etc ) and daily therapy exercises  Radiographs are typically taken at intervals throughout the fracture healing ensure maintenance of reduction and alignment  If the fracture loses its alignment, revision surgery may be required  Medical conditions such as diabetes, osteoporosis, vitamin D deficiency, and a history of or exposure to smoking may delay or prevent fracture healing    The risks and benefits of the procedure were explained to the patient, which include, but are not limited to: Bleeding, infection, recurrence, pain, scar, malunion, nonunion, damage to tendons, damage to nerves, and damage to blood vessels, and complications related to anesthesia, failure to give desire result, need for more surgery  These risks, along with alternative conservative treatment options, and postoperative protocols were voiced back and understood by the patient  All questions were answered to the patient's satisfaction  The patient agrees to comply with a standard postoperative protocol, and is willing to proceed  Education was provided via written and auditory forms  There were no barriers to learning  Written handouts regarding wound care, incision and scar care, and general preoperative information was provided to the patient  Prior to surgery, the patient may be requested to stop all anti-inflammatory medications  Prophylactic aspirin, Plavix, and Coumadin may be allowed to be continued  Medications including vitamin E , ginkgo, and fish oil are requested to be stopped approximately one week prior to surgery  Hypertensive medications and beta blockers, if taken, should be continued      Scribe Attestation    I,:   Glo Thurman am acting as a scribe while in the presence of the attending physician :        I,:   Lamar Servin MD personally performed the services described in this documentation    as scribed in my presence :

## 2020-03-02 NOTE — H&P
CHIEF COMPLAINT:  Chief Complaint   Patient presents with    Left Wrist - Pain       SUBJECTIVE:  Jeri Noguera is a 22y o  year old RHD male who presents to the office for evaluation of his left wrist   Patient sustained injury to left wrist when he fell 8 ft from a ladder landing on the ground  Patient was seen in the emergency department 03/01/2020 where x-rays were performed and patient was splinted  Pt is taking Ibuprofen for pain  Pt states that he has numbness and tingling oat the tips of his fingers  Pt denies previous injury to his left wrist     The patient denies any cardiac or pulmonary issues  Denies diabetes  Denies any history of MI, gastric ulcers, kidney or liver issues  Denies blood thinners  PAST MEDICAL HISTORY:  History reviewed  No pertinent past medical history  PAST SURGICAL HISTORY:  History reviewed  No pertinent surgical history  FAMILY HISTORY:  History reviewed  No pertinent family history  SOCIAL HISTORY:  Social History     Tobacco Use    Smoking status: Current Every Day Smoker     Types: Cigarettes    Smokeless tobacco: Never Used   Substance Use Topics    Alcohol use: Yes     Comment: Occasionally     Drug use: Never       MEDICATIONS:    Current Outpatient Medications:     ibuprofen (MOTRIN) 600 mg tablet, Take 1 tablet (600 mg total) by mouth every 6 (six) hours as needed for mild pain, Disp: 30 tablet, Rfl: 0    albuterol (PROVENTIL HFA,VENTOLIN HFA) 90 mcg/act inhaler, Inhale 2 puffs every 6 (six) hours as needed for wheezing or shortness of breath, Disp: 3 Inhaler, Rfl: 3    oxyCODONE-acetaminophen (PERCOCET) 5-325 mg per tablet, Take 1 tablet by mouth every 4 (four) hours as needed for moderate pain To be taken after surgeryMax Daily Amount: 6 tablets, Disp: 30 tablet, Rfl: 0  No current facility-administered medications for this visit       ALLERGIES:  No Known Allergies    REVIEW OF SYSTEMS:  Review of Systems   Constitutional: Negative for chills, fever and unexpected weight change  HENT: Negative for hearing loss, nosebleeds and sore throat  Eyes: Negative for pain, redness and visual disturbance  Respiratory: Negative for cough, shortness of breath and wheezing  Cardiovascular: Negative for chest pain, palpitations and leg swelling  Gastrointestinal: Negative for abdominal pain, nausea and vomiting  Endocrine: Negative for polydipsia and polyuria  Genitourinary: Negative for dysuria and hematuria  Skin: Negative for rash and wound  Neurological: Negative for dizziness, light-headedness and headaches  Psychiatric/Behavioral: Negative for decreased concentration, dysphoric mood and suicidal ideas  The patient is not nervous/anxious          VITALS:  Vitals:    03/02/20 0944   BP: 128/80   Pulse: 68       LABS:  HgA1c: No results found for: HGBA1C  BMP:   Lab Results   Component Value Date    CALCIUM 9 2 11/22/2019    K 4 5 11/22/2019    CO2 29 11/22/2019     11/22/2019    BUN 10 11/22/2019    CREATININE 0 97 11/22/2019       _____________________________________________________  PHYSICAL EXAMINATION:  General: well developed and well nourished, alert, oriented times 3 and appears comfortable  Psychiatric: Normal  HEENT: Trachea Midline, No torticollis  Pulmonary: No audible wheezing or strider  Cardiovascular: No discernable arrhythmia   Skin: No masses, erythema, lacerations, fluctation, ulcerations  Neurovascular: Sensation Intact to the Median, Ulnar, Radial Nerve, Motor Intact to the Median, Ulnar, Radial Nerve and Pulses Intact    MUSCULOSKELETAL EXAMINATION:  Left wrist  Fullness at the wrist   Motion not tested due to injury      ___________________________________________________  STUDIES REVIEWED:  X-rays left wrist performed 03/01/2020 show comminuted intra-articular fracture of the distal radius and nondisplaced fracture with dorsal apex angulation of the ulnar styloid process      PROCEDURES PERFORMED:  Procedures  No Procedures performed today    _____________________________________________________  ASSESSMENT/PLAN:  Left distal radius intra articular fracture and ulna styloid fracture 3/1/2020  ORIF left distal radius was discussed at length today including the risks and benefits  Pt understands and wants to proceed  *Surgery- ORIF left distal radius    * detailed consent was signed in the office   * anesthesia- regional with sedation    * antibiotics- ordered    * OT order was placed   * Post op medication was sent to the office on file   * Pt was advised to stop NSAIDs and take the prescribed pain medication   Surgery medication instructions: You will stop eating and drinking at midnight the night before your surgery, but you may continue to take your normal medications with a small sip of water  In the morning on the day of your surgery, we would like you to take the following medications (as long as you have never been told to avoid Tylenol or NSAIDs like ibuprofen, Naproxen, Aleve, Advil, etc):   Ibuprofen 600mg one tablet by mouth   Tylenol 500mg one tablet by mouth    After surgery, we would like you to take Ibuprofen 600mg one tablet by mouth every 6 hours with food (at breakfast, lunch and dinner)  AND Tylenol 500 mg one tablet by mouth every 6 hours  (at breakfast, lunch and dinner) for 5-7 days after your surgery  Please take these medication EVERYDAY after surgery for 5-7 days, and not just as needed  You can take these medications at the same time  Taking these medications after surgery will limit your need for prescription pain medication  We will also prescribe a narcotic pain medication for a limited time after surgery that you can take as needed for moderate or severe pain           Diagnoses and all orders for this visit:    Closed fracture distal radius and ulna, right, initial encounter  -     Case request operating room: OPEN REDUCTION W/ INTERNAL FIXATION (ORIF) RADIUS / ULNA (WRIST) Left; Standing  -     oxyCODONE-acetaminophen (PERCOCET) 5-325 mg per tablet; Take 1 tablet by mouth every 4 (four) hours as needed for moderate pain To be taken after surgeryMax Daily Amount: 6 tablets  -     Case request operating room: OPEN REDUCTION W/ INTERNAL FIXATION (ORIF) RADIUS / ULNA (WRIST) Left  -     Ambulatory referral to PT/OT hand therapy; Future    Other orders  -     Diet NPO; Sips with meds; Standing  -     Height and weight upon arrival; Standing  -     Void on call to OR; Standing  -     Insert peripheral IV; Standing  -     ceFAZolin (ANCEF) IVPB (premix) 2,000 mg        Follow Up:  Return for after surgery  Work/school status:  Provided     To Do Next Visit:  Re-evaluation of current issue xr left wrist       Operative Discussions:  Distal Radius Fracture Fixation: Both operative and non operative management of the injury was explained to the patient  The decision was made to undergo surgical fixation  The surgical procedure was explained with a verbal understanding expressed by the patient  Postoperatively, the patient is to begin with occupational therapy to have a removable splint applied  This splint may be removed for showering, bathing, dining, sedative activities (eg watching tv, reading etc ) and daily therapy exercises  Radiographs are typically taken at intervals throughout the fracture healing ensure maintenance of reduction and alignment  If the fracture loses its alignment, revision surgery may be required  Medical conditions such as diabetes, osteoporosis, vitamin D deficiency, and a history of or exposure to smoking may delay or prevent fracture healing    The risks and benefits of the procedure were explained to the patient, which include, but are not limited to: Bleeding, infection, recurrence, pain, scar, malunion, nonunion, damage to tendons, damage to nerves, and damage to blood vessels, and complications related to anesthesia, failure to give desire result, need for more surgery  These risks, along with alternative conservative treatment options, and postoperative protocols were voiced back and understood by the patient  All questions were answered to the patient's satisfaction  The patient agrees to comply with a standard postoperative protocol, and is willing to proceed  Education was provided via written and auditory forms  There were no barriers to learning  Written handouts regarding wound care, incision and scar care, and general preoperative information was provided to the patient  Prior to surgery, the patient may be requested to stop all anti-inflammatory medications  Prophylactic aspirin, Plavix, and Coumadin may be allowed to be continued  Medications including vitamin E , ginkgo, and fish oil are requested to be stopped approximately one week prior to surgery  Hypertensive medications and beta blockers, if taken, should be continued      Scribe Attestation    I,:   David Salazar am acting as a scribe while in the presence of the attending physician :        I,:   Jimy Matthews MD personally performed the services described in this documentation    as scribed in my presence :

## 2020-03-02 NOTE — PRE-PROCEDURE INSTRUCTIONS
Pre-Surgery Instructions:   Medication Instructions    albuterol (PROVENTIL HFA,VENTOLIN HFA) 90 mcg/act inhaler Instructed patient per Anesthesia Guidelines   ibuprofen (MOTRIN) 600 mg tablet Pt instructed to hold prior to procedure    oxyCODONE-acetaminophen (PERCOCET) 5-325 mg per tablet Patient was instructed by Physician and understands

## 2020-03-03 ENCOUNTER — HOSPITAL ENCOUNTER (OUTPATIENT)
Facility: HOSPITAL | Age: 25
Setting detail: OUTPATIENT SURGERY
Discharge: HOME | End: 2020-03-03
Attending: ORTHOPAEDIC SURGERY | Admitting: ORTHOPAEDIC SURGERY
Payer: COMMERCIAL

## 2020-03-03 ENCOUNTER — ANESTHESIA (OUTPATIENT)
Dept: PERIOP | Facility: HOSPITAL | Age: 25
End: 2020-03-03
Payer: COMMERCIAL

## 2020-03-03 ENCOUNTER — ANESTHESIA EVENT (OUTPATIENT)
Dept: PERIOP | Facility: HOSPITAL | Age: 25
End: 2020-03-03
Payer: COMMERCIAL

## 2020-03-03 ENCOUNTER — APPOINTMENT (OUTPATIENT)
Dept: RADIOLOGY | Facility: HOSPITAL | Age: 25
End: 2020-03-03
Payer: COMMERCIAL

## 2020-03-03 VITALS
OXYGEN SATURATION: 95 % | SYSTOLIC BLOOD PRESSURE: 139 MMHG | HEIGHT: 70 IN | DIASTOLIC BLOOD PRESSURE: 77 MMHG | BODY MASS INDEX: 31.16 KG/M2 | HEART RATE: 69 BPM | TEMPERATURE: 97.6 F | RESPIRATION RATE: 20 BRPM

## 2020-03-03 PROCEDURE — C1713 ANCHOR/SCREW BN/BN,TIS/BN: HCPCS | Performed by: ORTHOPAEDIC SURGERY

## 2020-03-03 PROCEDURE — C1769 GUIDE WIRE: HCPCS | Performed by: ORTHOPAEDIC SURGERY

## 2020-03-03 PROCEDURE — 25609 OPTX DST RD XART FX/EP SEP3+: CPT | Performed by: ORTHOPAEDIC SURGERY

## 2020-03-03 PROCEDURE — 73100 X-RAY EXAM OF WRIST: CPT

## 2020-03-03 PROCEDURE — 25609 OPTX DST RD XART FX/EP SEP3+: CPT | Performed by: PHYSICIAN ASSISTANT

## 2020-03-03 DEVICE — ACU-LOC® 2 VDR PLT, WIDE, L
Type: IMPLANTABLE DEVICE | Site: WRIST | Status: FUNCTIONAL
Brand: ACUMED

## 2020-03-03 DEVICE — 3.5MM X 16.0MM CORTICAL SCREW
Type: IMPLANTABLE DEVICE | Site: WRIST | Status: FUNCTIONAL
Brand: ACUMED

## 2020-03-03 DEVICE — 3.5MM X 16.0MM LOCKING CORTICAL SCREW
Type: IMPLANTABLE DEVICE | Site: WRIST | Status: FUNCTIONAL
Brand: ACUMED

## 2020-03-03 DEVICE — 2.3MM X 20MM LOCKING CORTICAL PEG
Type: IMPLANTABLE DEVICE | Site: WRIST | Status: FUNCTIONAL
Brand: ACUMED

## 2020-03-03 DEVICE — 3.5MM X 14.0MM LOCKING CORTICAL SCREW
Type: IMPLANTABLE DEVICE | Site: WRIST | Status: FUNCTIONAL
Brand: ACUMED

## 2020-03-03 RX ORDER — LIDOCAINE HYDROCHLORIDE 10 MG/ML
INJECTION, SOLUTION EPIDURAL; INFILTRATION; INTRACAUDAL; PERINEURAL AS NEEDED
Status: DISCONTINUED | OUTPATIENT
Start: 2020-03-03 | End: 2020-03-03 | Stop reason: SURG

## 2020-03-03 RX ORDER — SODIUM CHLORIDE, SODIUM LACTATE, POTASSIUM CHLORIDE, CALCIUM CHLORIDE 600; 310; 30; 20 MG/100ML; MG/100ML; MG/100ML; MG/100ML
125 INJECTION, SOLUTION INTRAVENOUS CONTINUOUS
Status: DISCONTINUED | OUTPATIENT
Start: 2020-03-03 | End: 2020-03-03 | Stop reason: HOSPADM

## 2020-03-03 RX ORDER — TRAMADOL HYDROCHLORIDE 50 MG/1
50 TABLET ORAL EVERY 6 HOURS PRN
Status: DISCONTINUED | OUTPATIENT
Start: 2020-03-03 | End: 2020-03-03 | Stop reason: HOSPADM

## 2020-03-03 RX ORDER — MAGNESIUM HYDROXIDE 1200 MG/15ML
LIQUID ORAL AS NEEDED
Status: DISCONTINUED | OUTPATIENT
Start: 2020-03-03 | End: 2020-03-03 | Stop reason: HOSPADM

## 2020-03-03 RX ORDER — MIDAZOLAM HYDROCHLORIDE 2 MG/2ML
INJECTION, SOLUTION INTRAMUSCULAR; INTRAVENOUS
Status: COMPLETED | OUTPATIENT
Start: 2020-03-03 | End: 2020-03-03

## 2020-03-03 RX ORDER — DEXAMETHASONE SODIUM PHOSPHATE 4 MG/ML
INJECTION, SOLUTION INTRA-ARTICULAR; INTRALESIONAL; INTRAMUSCULAR; INTRAVENOUS; SOFT TISSUE AS NEEDED
Status: DISCONTINUED | OUTPATIENT
Start: 2020-03-03 | End: 2020-03-03 | Stop reason: SURG

## 2020-03-03 RX ORDER — PROPOFOL 10 MG/ML
INJECTION, EMULSION INTRAVENOUS AS NEEDED
Status: DISCONTINUED | OUTPATIENT
Start: 2020-03-03 | End: 2020-03-03 | Stop reason: SURG

## 2020-03-03 RX ORDER — ONDANSETRON 2 MG/ML
4 INJECTION INTRAMUSCULAR; INTRAVENOUS EVERY 6 HOURS PRN
Status: DISCONTINUED | OUTPATIENT
Start: 2020-03-03 | End: 2020-03-03 | Stop reason: HOSPADM

## 2020-03-03 RX ORDER — KETAMINE HYDROCHLORIDE 50 MG/ML
INJECTION, SOLUTION, CONCENTRATE INTRAMUSCULAR; INTRAVENOUS AS NEEDED
Status: DISCONTINUED | OUTPATIENT
Start: 2020-03-03 | End: 2020-03-03 | Stop reason: SURG

## 2020-03-03 RX ORDER — ACETAMINOPHEN 325 MG/1
650 TABLET ORAL EVERY 6 HOURS PRN
Status: DISCONTINUED | OUTPATIENT
Start: 2020-03-03 | End: 2020-03-03 | Stop reason: HOSPADM

## 2020-03-03 RX ORDER — ROPIVACAINE HYDROCHLORIDE 5 MG/ML
INJECTION, SOLUTION EPIDURAL; INFILTRATION; PERINEURAL
Status: COMPLETED | OUTPATIENT
Start: 2020-03-03 | End: 2020-03-03

## 2020-03-03 RX ORDER — DEXMEDETOMIDINE HYDROCHLORIDE 100 UG/ML
INJECTION, SOLUTION INTRAVENOUS AS NEEDED
Status: DISCONTINUED | OUTPATIENT
Start: 2020-03-03 | End: 2020-03-03 | Stop reason: SURG

## 2020-03-03 RX ORDER — FENTANYL CITRATE 50 UG/ML
INJECTION, SOLUTION INTRAMUSCULAR; INTRAVENOUS
Status: COMPLETED | OUTPATIENT
Start: 2020-03-03 | End: 2020-03-03

## 2020-03-03 RX ORDER — PROPOFOL 10 MG/ML
INJECTION, EMULSION INTRAVENOUS CONTINUOUS PRN
Status: DISCONTINUED | OUTPATIENT
Start: 2020-03-03 | End: 2020-03-03 | Stop reason: SURG

## 2020-03-03 RX ORDER — ACETAMINOPHEN 500 MG
500 TABLET ORAL ONCE
COMMUNITY
End: 2020-04-30

## 2020-03-03 RX ORDER — CEFAZOLIN SODIUM 2 G/50ML
2000 SOLUTION INTRAVENOUS ONCE
Status: COMPLETED | OUTPATIENT
Start: 2020-03-03 | End: 2020-03-03

## 2020-03-03 RX ORDER — LIDOCAINE HYDROCHLORIDE 10 MG/ML
INJECTION, SOLUTION EPIDURAL; INFILTRATION; INTRACAUDAL; PERINEURAL
Status: COMPLETED | OUTPATIENT
Start: 2020-03-03 | End: 2020-03-03

## 2020-03-03 RX ADMIN — DEXMEDETOMIDINE HCL 40 MCG: 100 INJECTION INTRAVENOUS at 12:18

## 2020-03-03 RX ADMIN — MIDAZOLAM HYDROCHLORIDE 2 MG: 1 INJECTION, SOLUTION INTRAMUSCULAR; INTRAVENOUS at 12:18

## 2020-03-03 RX ADMIN — CEFAZOLIN SODIUM 2000 MG: 2 SOLUTION INTRAVENOUS at 13:16

## 2020-03-03 RX ADMIN — PROPOFOL 100 MCG/KG/MIN: 10 INJECTION, EMULSION INTRAVENOUS at 13:25

## 2020-03-03 RX ADMIN — KETAMINE HYDROCHLORIDE 20 MG: 50 INJECTION INTRAMUSCULAR; INTRAVENOUS at 13:24

## 2020-03-03 RX ADMIN — FENTANYL CITRATE 50 MCG: 50 INJECTION, SOLUTION INTRAMUSCULAR; INTRAVENOUS at 12:18

## 2020-03-03 RX ADMIN — LIDOCAINE HYDROCHLORIDE 5 ML: 10 INJECTION, SOLUTION EPIDURAL; INFILTRATION; INTRACAUDAL; PERINEURAL at 12:18

## 2020-03-03 RX ADMIN — LIDOCAINE HYDROCHLORIDE 50 MG: 10 INJECTION, SOLUTION EPIDURAL; INFILTRATION; INTRACAUDAL; PERINEURAL at 13:24

## 2020-03-03 RX ADMIN — PROPOFOL 70 MG: 10 INJECTION, EMULSION INTRAVENOUS at 13:24

## 2020-03-03 RX ADMIN — ROPIVACAINE HYDROCHLORIDE 30 ML: 5 INJECTION, SOLUTION EPIDURAL; INFILTRATION; PERINEURAL at 12:18

## 2020-03-03 RX ADMIN — SODIUM CHLORIDE, SODIUM LACTATE, POTASSIUM CHLORIDE, AND CALCIUM CHLORIDE 125 ML/HR: .6; .31; .03; .02 INJECTION, SOLUTION INTRAVENOUS at 12:01

## 2020-03-03 RX ADMIN — DEXAMETHASONE SODIUM PHOSPHATE 4 MG: 4 INJECTION, SOLUTION INTRAMUSCULAR; INTRAVENOUS at 12:18

## 2020-03-03 NOTE — OP NOTE
OPERATIVE REPORT    PATIENT NAME: Louann Solorzano    MEDICAL RECORD NO:  [id number]    PROCEDURE DATE:  20    :  1995    SURGEON:  TATO Elmore , Ph D     Jeremy Goldman:  Deidra Sandoval    No qualified resident was available to assist for this surgery  A Physician Assistant was used to aid in reduction and retraction while the orthopedic hardware was placed  PREOPERATIVE DIAGNOSIS:    left distal radius 3+ part intra-articular fracture    POSTOPERATIVE DIAGNOSIS:   left distal radius 3+ part intra-articular fracture    PROCEDURE PERFORMED:    Open reduction internal fixation of left distal radius    ANESTHESIA:  Regional and conscious sedation    COMPLICATIONS: none    TOURNIQUET TIME:  41 minutes at 250 mmHg     EQUIPMENT USED:  wide Aculoc plating system     DISPOSITION: Patient was sent to the PACU in stable condition  INDICATIONS: Patient is a 22 y o  male who suffered a left distal radius intra-articular fracture as determined by imaging studies  Surgical intervention was offered and the risks and benefits of open reduction and internal fixation were explained  Consent was obtained for surgical intervention  PROCEDURE:  The patient was identified in the preoperative screening area  Consent was signed and verified after identifying the correct operative site  The patient was taken back to the operating room after receiving axillary block in the pre-op holding area  Regional and conscious sedation was provided  The patients left upper extremity was prepped and draped in normal sterile fashion with chlorhexidine solution  The arm was then elevated and exsanguinated with an Esmarch and tourniquet placed about the brachium was insufflated to 250 mmHg  A volar longitudinal incision was made over the FCR tendon approximately 6-8cm in length ending just proximal to the wrist flexion crease    Subcutaneous tissue was dissected sharply and superficial vessels were cauterized or preserved where possible  The superficial and deep portions of the FCR sheath were incised and the FCR tendon was reflected ulnarly along with the deeper FPL tendon  The pronator quadratus was identified and released off its radial attachment  The fracture site was identified and was irrigated and cleaned of debris  The fracture was then provisionally reduced and held with K-wires  The articular surface was reconstructed first and secured with provisionally placed K-wires  The reconstructed articular surface was then reduced to the proximal shaft and secured with an 0 62 K-wire placed from the radial styloid into the proximal shaft  Once provisional reduction was achieved, the wide Aculoc plate was placed over the fracture site and provisionally stabilized with 0 054 K-wires  Intra-operative fluoroscopic imaging demonstrated good alignment of the fracture fragments and good positioning of the plate  The distal portion of the plate was then secured first  The distal row was filled with 2 4 mm locking pegs and screws of appropriate length  The two styloid holes of the plate were filled with 2 4 mm locking pegs of appropriate length  The temporary K-wires were then removed  Final manipulation of the fracture was then performed prior to securing the plate to the proximal fragment  The plate was secured to the proximal fragment using one 3 5 mm bicortical non-locking screw placed in compression  Two 3 5 mm bicortical locking screws were then placed to lock the final construct  Screws of appropriate length were placed  Fracture stabilization was assessed and found to be stable and secure  Final intra-operative fluoroscopic images were obtained demonstrating good reduction of the fracture and articular surface and good placement of the hardware  The wound was irrigated with copious amounts of saline solution    The pronator quadratus was then repaired back to the radial margin with 3-0 Ethibond sutures placed in a figure-8 fashion  Good soft tissue interposition between the plate and flexor tendons was achieved  The tendons were riding over soft tissue and not in direct contact with the plate  The Tourniquet was released at approximately 41 minutes with good capillary refill and color in the digits  Hemostasis was achieved  The skin was then closed with 4-0 nylon suture in a horizontal mattress fashion  The wound was dressed in a sterile dressing and the wrist was immobilized in a volar wrist splint  The patient tolerated the procedure well, was awakened in the operating room, and sent to the PACU in stable condition  As no first assistant was provided by the hospital, it was elected to use a physician's assistant to stabilize the extremity and aid in instrumentation       I was present for the entire procedure     Patient Disposition:  PACU      SIGNATURE: Pepito Hsieh MD/PhD  DATE: 03/03/20  TIME:  2:18 PM

## 2020-03-03 NOTE — ANESTHESIA PREPROCEDURE EVALUATION
Review of Systems/Medical History  Patient summary reviewed  Chart reviewed  No history of anesthetic complications     Cardiovascular  Exercise tolerance (METS): >4,     Pulmonary  Smoker cigarette smoker  , Tobacco cessation counseling given , Asthma , PRN med  controlled Last rescue: < 6 months ago Asthma type of rescue: PRN inhaler,        GI/Hepatic            Endo/Other    Obesity    GYN       Hematology   Musculoskeletal       Neurology   Psychology           Physical Exam    Airway    Mallampati score: II  TM Distance: >3 FB  Neck ROM: full     Dental   No notable dental hx     Cardiovascular      Pulmonary      Other Findings        Anesthesia Plan  ASA Score- 2     Anesthesia Type- IV sedation with anesthesia and regional with ASA Monitors  Additional Monitors:   Airway Plan:         Plan Factors- Patient instructed to abstain from smoking on day of procedure  Patient smoked on day of surgery  Induction- intravenous  Postoperative Plan-     Informed Consent- Anesthetic plan and risks discussed with patient  I personally reviewed this patient with the CRNA  Discussed and agreed on the Anesthesia Plan with the CRNA  Brittney Gar

## 2020-03-03 NOTE — ANESTHESIA POSTPROCEDURE EVALUATION
Post-Op Assessment Note    CV Status:  Stable  Pain Score: 0    Pain management: adequate     Mental Status:  Awake   Hydration Status:  Stable   PONV Controlled:  None   Airway Patency:  Patent and adequate   Post Op Vitals Reviewed: Yes      Staff: CRNA           BP   123/70   Temp  97 6   Pulse  84   Resp   16   SpO2   96

## 2020-03-03 NOTE — ANESTHESIA PROCEDURE NOTES
Peripheral Block    Patient location during procedure: holding area  Start time: 3/3/2020 12:15 PM  Reason for block: at surgeon's request and post-op pain management  Staffing  Anesthesiologist: Laverne June MD  Performed: anesthesiologist   Preanesthetic Checklist  Completed: patient identified, site marked, surgical consent, pre-op evaluation, timeout performed, IV checked, risks and benefits discussed and monitors and equipment checked  Peripheral Block  Patient position: supine  Prep: ChloraPrep  Patient monitoring: continuous pulse ox, frequent blood pressure checks, heart rate and cardiac monitor  Block type: supraclavicular  Laterality: left  Injection technique: single-shot  Procedures: ultrasound guided, Ultrasound guidance required for the procedure to increase accuracy and safety of medication placement and decrease risk of complications    Ultrasound permanent image savedropivacaine (NAROPIN) 0 5 % perineural infiltration, 30 mL  midazolam (VERSED) 2 mg/2 mL IV, 2 mg  fentaNYL 50 mcg/mL IV, 50 mcg  lidocaine (PF) (XYLOCAINE-MPF) 1 % infiltration, 5 mL  Needle  Needle type: Stimuplex   Needle gauge: 22 G  Needle length: 10 cm  Needle localization: anatomical landmarks and ultrasound guidance  Needle insertion depth: 3 cm  Assessment  Injection assessment: incremental injection, local visualized surrounding nerve on ultrasound, negative aspiration for heme and no paresthesia on injection  Paresthesia pain: none  Heart rate change: no  Slow fractionated injection: yes  Post-procedure:  site cleaned  patient tolerated the procedure well with no immediate complications

## 2020-03-03 NOTE — DISCHARGE INSTRUCTIONS
Post Operative Instructions    You have had surgery on your arm today, please read and follow the information below:  · Elevate your hand above your elbow during the next 24-48 hours to help with swelling  · Place your hand and arm over your head with motion at your shoulder three times a day  · Do not apply any cream/ointment/oil to your incisions including antibiotics  · Do not soak your hands in standing water (dishwater, tubs, Jacuzzi's, pools, etc ) until given permission (typically 2-3 weeks after injury)    Call the office at [phone]  if you notice any:  · Increased numbness or tingling of your hand or fingers that is not relieved with elevation  · Increasing pain that is not controlled with medication  · Difficulty chewing, breathing, swallowing  · Chest pains or shortness of breath  · Fever over 101 4 degrees  Bandage: Your therapist will remove your bandage at your first therapy appointment  Motion: Move fingers into a fist 5 times a day, DO NOT move any splinted fingers  Weight bearing status: The operated extremity should be non-weight bearing until further notice  Ice: Ice for 10 minutes every hour as needed for swelling x 24 hours  Sling: Sling for comfort for 2-3 days, or until pain block wears off  Pain medication: A prescription for pain medication was provided in the office and sent to your pharmacy  Your prescription pain medication also contains Tylenol  Please limit total Tylenol dose to under 3,000 mg a day  After surgery, we would like you to take Ibuprofen 600mg one tablet by mouth every 6 hours with food (at breakfast, lunch and dinner)  AND Tylenol 500 mg one tablet by mouth every 6 hours  (at breakfast, lunch and dinner) for 5-7 days after your surgery  Please take these medication EVERYDAY after surgery for 5-7 days, and not just as needed  You can take these medications at the same time    Taking these medications after surgery will limit your need for prescription pain medication  If the pain becomes severe, and the pain medication is not alleviating symptoms, The greatest source of pain after surgery is usually a tight dressing due to increased swelling after surgery  If the pain becomes severe after surgery, and the patient medication is NOT alleviating the symptoms, the patient should do the following: The patient should  loosen the top dressing (usually coban or an ace bandage) and loosen/cut the rolled gauze beneath  The 4x4 gauze that is directly covering the incision should remain in place  The splint (if the patient has one) should remain in place  The ace bandage/coban can then be replaced on top in a less constrictive manor  If this does not help relieve the pain/numbness in a few hours, the patient should call our office (number listed below)  and we can have them seen in the office for further evaluation  Follow-up Appointment: 7-10 days with Dr Rickie Fox  Occupational Therapy: 3/5/2020  AFTER THE FIRST THERAPY APPOINTMENT, the patient may remove the splint/dressing for showering and clean the incision with soap and water  Keep incision dry after washing  Do not expose the incision to dirty water (oceans, pools, hot tubs, etc)     If you need help scheduling Therapy, you can call [phone]        Please call the office at [phone] if you have any questions or concerns regarding your post-operative care

## 2020-03-05 ENCOUNTER — EVALUATION (OUTPATIENT)
Dept: OCCUPATIONAL THERAPY | Facility: CLINIC | Age: 25
End: 2020-03-05
Payer: COMMERCIAL

## 2020-03-05 DIAGNOSIS — S52.501A CLOSED FRACTURE DISTAL RADIUS AND ULNA, RIGHT, INITIAL ENCOUNTER: ICD-10-CM

## 2020-03-05 DIAGNOSIS — M25.532 WRIST PAIN, ACUTE, LEFT: Primary | ICD-10-CM

## 2020-03-05 DIAGNOSIS — S52.601A CLOSED FRACTURE DISTAL RADIUS AND ULNA, RIGHT, INITIAL ENCOUNTER: ICD-10-CM

## 2020-03-05 PROCEDURE — 97165 OT EVAL LOW COMPLEX 30 MIN: CPT | Performed by: OCCUPATIONAL THERAPIST

## 2020-03-05 PROCEDURE — 97110 THERAPEUTIC EXERCISES: CPT | Performed by: OCCUPATIONAL THERAPIST

## 2020-03-05 NOTE — PROGRESS NOTES
OT Evaluation     Today's date: 3/5/2020  Patient name: Varun Multani  : 1995  MRN: [id number]  Referring provider: Feliciano Paul MD  Dx:   Encounter Diagnosis     ICD-10-CM    1  Closed fracture distal radius and ulna, right, initial encounter S52 501A Ambulatory referral to PT/OT hand therapy    S52 599H                   Assessment  Assessment details: 21 yo RHD male POD2 L ORIF wrist   Patient presents to OT in bulky dressing  L volar wrist splint was fabricated; to be worn when out of the house  Sutures exhibited no s/sx of infection  Educated on wound care  Physically, patient presents with increased edema/pain and decreased ROM /strength  Functionally, 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  He has been using his left hand very little and mostly his fingers  HEP: AROM p/s, wrist, thumb opposition and TGE's; hand out issued  Patient would benefit from skilled OT in order to RTW full duty as a  and to be I with ADL's, IADL's and recreational tasks  Impairments: abnormal or restricted ROM, abnormal movement, impaired physical strength, lacks appropriate home exercise program, pain with function and weight-bearing intolerance    Symptom irritability: moderateUnderstanding of Dx/Px/POC: good   Prognosis: good    Goals  STG:  In 3 weeks  1  Decreased pain to 2-3/10 when performing ADL's   2   Increased ROM 5-15 degrees in order to independently perform light weight IADL's   3   Improve FOTO score 10%    LTG's 8 weeks  1    Patient will report minimal to no pain with all activities  2   Sufficient strength to perform ADL's and IADL's      Plan  Patient would benefit from: OT eval, skilled occupational therapy and custom splinting  Planned modality interventions: TENS, thermotherapy: hydrocollator packs and ultrasound  Planned therapy interventions: manual therapy, joint mobilization, massage, neuromuscular re-education, orthotic fitting/training, therapeutic activities, therapeutic exercise, home exercise program, graded activity, functional ROM exercises and fine motor coordination training  Frequency: 2x week  Duration in weeks: 8  Plan of Care beginning date: 3/5/2020  Plan of Care expiration date: 2020  Treatment plan discussed with: patient and family        Subjective Evaluation    History of Present Illness  Date of onset: 3/1/2020  Date of surgery: 3/3/2020  Mechanism of injury: surgery  Mechanism of injury: Patient fell off of ladder with subsequent surgery 2 days later  He does note pain  I have been using my left hand very little  Just my fingers  Patient is currently not working  He is a  for large semi trucks  Pain  Current pain rating: 3  At best pain rating: 3  At worst pain ratin  Location: volar wrist and proximal forearm   Quality: discomfort, throbbing and sharp  Relieving factors: medications, rest and support  Progression: improved    Social Support  Lives in: multiple-level home  Lives with: parents    Employment status: working  Hand dominance: right  Exercise history: lift weights / cardio  Life stress: medium      Diagnostic Tests  X-ray: abnormal  Treatments  No previous or current treatments  Patient Goals  Patient goal: to use my hand again         Objective     Observations     Left Wrist/Hand   Positive for edema  Additional Observation Details  Surgical site 7 cm long  Scant blood mid way surgical site    No s/sx of infection     Active Range of Motion     Left Elbow   Forearm supination: 45 degrees with pain  Forearm pronation: 60 degrees     Left Wrist   Wrist flexion: 25 degrees   Wrist extension: 35 degrees with pain  Radial deviation: 10 degrees   Ulnar deviation: 10 degrees     Right Wrist   Wrist flexion: 60 degrees   Wrist extension: 68 degrees   Radial deviation: 25 degrees   Ulnar deviation: 30 degrees     Left Thumb   Flexion     MP: 60 degrees    DIP: 35 degrees  Extension     CMC: 52 degrees  Palmar Abduction     CMC: 60 degrees  Opposition: Finger to thumb opposition intact but slow    Right Thumb   Flexion     MP: 55    DIP: 60  Extension     CMC: 65  Palmar Abduction    CMC: 80    Additional Active Range of Motion Details  Loose composite  fist    Patient is almost touching dpc                 Precautions: fracture/ universal precautions      Manual  3/5            Edema mgt             Wound care educated                                       splint L volar wrist/to be worn when out of house                Exercise Diary  3/5            L AROM P/S  HEP            L Th opposition HEP             L TGE"s  HEP            L AROM wrist HEP            Wrist maze                                                                                                                                                                                                                     Modalities

## 2020-03-09 ENCOUNTER — TELEPHONE (OUTPATIENT)
Dept: OBGYN CLINIC | Facility: HOSPITAL | Age: 25
End: 2020-03-09

## 2020-03-09 NOTE — TELEPHONE ENCOUNTER
Patient sees Dr Luz Valverde  Patient is calling in asking to know if someone from his disability office ever contacted us or faxed us over paperwork that needed to be completed  He is asking for a call back relating this        Call [phone]

## 2020-03-09 NOTE — TELEPHONE ENCOUNTER
I called patient in regards to his disability  I did advise him I have not received any phone call or paperwork for him  I gave him my direct number to have his disability company call me in regards to this

## 2020-03-10 ENCOUNTER — OFFICE VISIT (OUTPATIENT)
Dept: OCCUPATIONAL THERAPY | Facility: CLINIC | Age: 25
End: 2020-03-10
Payer: COMMERCIAL

## 2020-03-10 DIAGNOSIS — S52.601A CLOSED FRACTURE DISTAL RADIUS AND ULNA, RIGHT, INITIAL ENCOUNTER: Primary | ICD-10-CM

## 2020-03-10 DIAGNOSIS — S52.501A CLOSED FRACTURE DISTAL RADIUS AND ULNA, RIGHT, INITIAL ENCOUNTER: Primary | ICD-10-CM

## 2020-03-10 PROCEDURE — 97140 MANUAL THERAPY 1/> REGIONS: CPT

## 2020-03-10 PROCEDURE — 97110 THERAPEUTIC EXERCISES: CPT

## 2020-03-10 NOTE — PROGRESS NOTES
Daily Note     Today's date: 3/10/2020  Patient name: Santhosh Golden  : 1995  MRN: [id number]  Referring provider: Harsh Eaton MD  Dx:   Encounter Diagnosis     ICD-10-CM    1  Closed fracture distal radius and ulna, right, initial encounter S52 501A     S50 844Y                   Subjective: "It feels pretty good "      Objective: See treatment diary below      Assessment: Tolerated treatment well  Patient exhibited good technique with therapeutic exercises and would benefit from continued OT  Sutures remain intact  Minimal pink noted around incision line; pt instructed to monitor and contact OT or MD office if color changes  AROM improving  Plan: Continue per plan of care        Precautions: fracture/ universal precautions      Manual  3/5 3/10           Edema mgt  10'           Wound care educated             AA/PROM  2' digits only                        splint L volar wrist/to be worn when out of house                Exercise Diary  3/5 3/10           L AROM P/S  HEP            L Th opposition HEP opp slides 10x            L TGE"s  HEP 15x           L AROM wrist HEP 15x comp fist, 15x digits in ext           Wrist maze                                                                                                                                                                                                                     Modalities  3/10             5'

## 2020-03-11 ENCOUNTER — TELEPHONE (OUTPATIENT)
Dept: OBGYN CLINIC | Facility: HOSPITAL | Age: 25
End: 2020-03-11

## 2020-03-11 ENCOUNTER — TELEPHONE (OUTPATIENT)
Dept: OBGYN CLINIC | Facility: CLINIC | Age: 25
End: 2020-03-11

## 2020-03-11 NOTE — TELEPHONE ENCOUNTER
Please adviser patient that the picture suggests suture irritation at this time - this was discussed with PA- If patient continues to be concerned he can be seen in the office tomorrow where we will consider suture removal     Please advise patient

## 2020-03-11 NOTE — TELEPHONE ENCOUNTER
----- Message from Cris Lu sent at 3/11/2020 11:13 AM EDT -----  Regarding: FW: Pre-Op/Post-Op Question  Contact: [phone]      ----- Message -----  From: Susan Mathew  Sent: 3/11/2020  11:11 AM EDT  To: [facility]  Subject: Pre-Op/Post-Op Question                          Just wonder if this is looking ok my pt said something about how pink it was yesterday

## 2020-03-11 NOTE — TELEPHONE ENCOUNTER
Dr Roxy De Oliveira patient - L wrist ORIF 3/3    Patient calling that his incision looks pink  Denies increased pain or fever  Moderate swelling noted since surgery but nothing increased  Patient is sending a picture of incision thru mychart

## 2020-03-12 ENCOUNTER — APPOINTMENT (OUTPATIENT)
Dept: RADIOLOGY | Facility: CLINIC | Age: 25
End: 2020-03-12
Payer: COMMERCIAL

## 2020-03-12 ENCOUNTER — OFFICE VISIT (OUTPATIENT)
Dept: OBGYN CLINIC | Facility: CLINIC | Age: 25
End: 2020-03-12

## 2020-03-12 VITALS
HEIGHT: 70 IN | SYSTOLIC BLOOD PRESSURE: 135 MMHG | WEIGHT: 217.2 LBS | HEART RATE: 70 BPM | BODY MASS INDEX: 31.09 KG/M2 | DIASTOLIC BLOOD PRESSURE: 80 MMHG

## 2020-03-12 DIAGNOSIS — Z48.89 AFTERCARE FOLLOWING SURGERY: ICD-10-CM

## 2020-03-12 DIAGNOSIS — Z48.89 AFTERCARE FOLLOWING SURGERY: Primary | ICD-10-CM

## 2020-03-12 PROCEDURE — 99024 POSTOP FOLLOW-UP VISIT: CPT | Performed by: ORTHOPAEDIC SURGERY

## 2020-03-12 PROCEDURE — 73110 X-RAY EXAM OF WRIST: CPT

## 2020-03-12 NOTE — PROGRESS NOTES
SUBJECTIVE:  Louann Solorzano is a 22y o  year old male who presents for follow up after surgery, left distal radius ORIF performed on  3/3/2020  Today patient has minimal discomfort  Pt presents to the office because he is concerned about his suture irritation        VITALS:  Vitals:    03/12/20 1009   BP: 135/80   Pulse: 70       PHYSICAL EXAMINATION:  General: well developed and well nourished, alert, oriented times 3 and appears comfortable  Psychiatric: Normal    MUSCULOSKELETAL EXAMINATION:  Left wrist  Incision: sutures intact, minimal clear drainage from suture hole after removal- slight suture irritation   Range of Motion:good motion of fingers stiff flexion and extention of wrist stiff motion of thumb  Neurovascular status: Neuro intact, good cap refill      STUDIES REVIEWED:  X-rays left wrist performed today show distal radius fracture in maintained stable alignment, orthopedic hardware intact, nondisplaced ulnar styloid fracture      PROCEDURES PERFORMED:  Procedures  No Procedures performed today      ASSESSMENT/PLAN:  S/P Left distal radius ORIF  * patient was advised to continue splint with activity  * Steri-Strips were placed after suture removal and dry dressing was applied, patient was advised to change dressing daily  * patient was advised to call the office if he has any questions or concerns    FOLLOW UP:  As previously scheduled      TO DO AT NEXT VISIT:  Re-evaluation of current issue no xray       Scribe Attestation    I,:   Lucas Bansal am acting as a scribe while in the presence of the attending physician :        I,:   Maylin Phillips MD personally performed the services described in this documentation    as scribed in my presence :

## 2020-03-13 ENCOUNTER — OFFICE VISIT (OUTPATIENT)
Dept: OCCUPATIONAL THERAPY | Facility: CLINIC | Age: 25
End: 2020-03-13
Payer: COMMERCIAL

## 2020-03-13 DIAGNOSIS — S52.501A CLOSED FRACTURE DISTAL RADIUS AND ULNA, RIGHT, INITIAL ENCOUNTER: Primary | ICD-10-CM

## 2020-03-13 DIAGNOSIS — S52.601A CLOSED FRACTURE DISTAL RADIUS AND ULNA, RIGHT, INITIAL ENCOUNTER: Primary | ICD-10-CM

## 2020-03-13 PROCEDURE — 97110 THERAPEUTIC EXERCISES: CPT

## 2020-03-13 PROCEDURE — 97140 MANUAL THERAPY 1/> REGIONS: CPT

## 2020-03-13 NOTE — PROGRESS NOTES
Daily Note     Today's date: 3/13/2020  Patient name: Alisa Mosley  : 1995  MRN: [id number]  Referring provider: Tono Coronado MD  Dx:   Encounter Diagnosis     ICD-10-CM    1  Closed fracture distal radius and ulna, right, initial encounter S52 501A     S50 763G                   Subjective: "I saw the Dr yesterday  He took the stitches out "      Objective: See treatment diary below  AROM (pre): wrist ext/flex = 45/20  AROM (post): wrist ext/flex = 60/45    Assessment: Tolerated treatment well  Patient exhibited good technique with therapeutic exercises and would benefit from continued OT  Pt denies pain; c/o "tightness" with AROM  Incision site is healing, minimal dehiscence noted at distal end but no active drainage present  Plan: Continue per plan of care        Precautions: fracture/ universal precautions      Manual  3/5 3/10 3/13          Edema mgt  10' retrogrmassage 8'          Wound care educated             AA/PROM  2' digits only 2' digits only                       splint L volar wrist/to be worn when out of house                Exercise Diary  3/5 3/10 3/13          L AROM P/S  HEP            L Th opposition HEP opp slides 10x opp slides 10x           L TGE"s  HEP 15x 15x          L AROM wrist HEP 15x comp fist, 15x digits in ext tenodesis 2x10          Wrist maze              FDS/FDP diff glides   10x          Grooved pegboard   translation 1x                                                                                                                                                                                       Modalities  3/10            MH 5'

## 2020-03-17 ENCOUNTER — OFFICE VISIT (OUTPATIENT)
Dept: OBGYN CLINIC | Facility: CLINIC | Age: 25
End: 2020-03-17

## 2020-03-17 ENCOUNTER — OFFICE VISIT (OUTPATIENT)
Dept: OCCUPATIONAL THERAPY | Facility: CLINIC | Age: 25
End: 2020-03-17
Payer: COMMERCIAL

## 2020-03-17 VITALS
BODY MASS INDEX: 31.09 KG/M2 | HEIGHT: 70 IN | WEIGHT: 217.2 LBS | SYSTOLIC BLOOD PRESSURE: 148 MMHG | HEART RATE: 71 BPM | DIASTOLIC BLOOD PRESSURE: 93 MMHG

## 2020-03-17 DIAGNOSIS — S52.501A CLOSED FRACTURE DISTAL RADIUS AND ULNA, RIGHT, INITIAL ENCOUNTER: Primary | ICD-10-CM

## 2020-03-17 DIAGNOSIS — Z48.89 AFTERCARE FOLLOWING SURGERY: Primary | ICD-10-CM

## 2020-03-17 DIAGNOSIS — S52.601A CLOSED FRACTURE DISTAL RADIUS AND ULNA, RIGHT, INITIAL ENCOUNTER: Primary | ICD-10-CM

## 2020-03-17 DIAGNOSIS — M25.532 WRIST PAIN, ACUTE, LEFT: ICD-10-CM

## 2020-03-17 PROCEDURE — 3008F BODY MASS INDEX DOCD: CPT | Performed by: PHYSICIAN ASSISTANT

## 2020-03-17 PROCEDURE — 97110 THERAPEUTIC EXERCISES: CPT | Performed by: OCCUPATIONAL THERAPIST

## 2020-03-17 PROCEDURE — 99024 POSTOP FOLLOW-UP VISIT: CPT | Performed by: PHYSICIAN ASSISTANT

## 2020-03-17 PROCEDURE — 97140 MANUAL THERAPY 1/> REGIONS: CPT | Performed by: OCCUPATIONAL THERAPIST

## 2020-03-17 NOTE — PROGRESS NOTES
Daily Note     Today's date: 3/17/2020  Patient name: Елена Hand  : 1995  MRN: [id number]  Referring provider: Cheryl Snow MD  Dx:   Encounter Diagnosis     ICD-10-CM    1  Closed fracture distal radius and ulna, right, initial encounter S52 501A     S52 601A    2  Wrist pain, acute, left M25 532                   Subjective:  " I've been trying to use my hand a little bit, picking up light stuff and holding the steering wheel straight "      Objective: See treatment diary below    L AROM wrist f/e :  45/60 no change since last visit  L AROM supination 75 degrees   Assessment: Tolerated treatment well  Patient would benefit from continued OT  Surgical site is red but not tender to touch  There are scabbed areas and patient was advised to allow scabs to naturally fall off and to not pick at scabs as this might lead to infection  There is no active drainage at this time  Patient is able to make a full composite fist   Thumb opposition intact with slide  Added wrist flexion dangling over chair to HEP as he is lacking the most in this area  Overall good progression since IE  Plan: Continue per plan of care        Precautions: fracture/ universal precautions      Manual  3/5 3/10 3/13 3/16         Edema mgt  10' retrogrmassage 8' x8'         Wound care educated             AA/PROM  2' digits only 2' digits only Wrist f/e                      splint L volar wrist/to be worn when out of house                Exercise Diary  3/5 3/10 3/13 3/16         L AROM P/S  HEP   holding RFB x 15xs         L Th opposition HEP opp slides 10x opp slides 10x           L TGE"s  HEP 15x 15x          L AROM wrist HEP 15x comp fist, 15x digits in ext tenodesis 2x10 Fisted AROM wrsit f         Wrist maze              FDS/FDP diff glides   10x          Grooved pegboard   translation 1x Static  3 @ a time in /5@ a time out         LLPS wrist flex    1' 3xs Modalities  3/10             5'

## 2020-03-17 NOTE — PROGRESS NOTES
CHIEF COMPLAINT:  Chief Complaint   Patient presents with    Left Wrist - Post-op       SUBJECTIVE:  Anthony Santos is a 22y o  year old male who presents for follow-up regarding left distal radius ORIF  Patient has been going to physical therapy to work on his range of motion  He is also here for a wound recheck  He states he has noticed that the redness has gone down  He still notes some itchiness over the incision  He has been taking some Tylenol and anti-inflammatories as needed for the pain  He denies any numbness or tingling  PAST MEDICAL HISTORY:  Past Medical History:   Diagnosis Date    Asthma        PAST SURGICAL HISTORY:  Past Surgical History:   Procedure Laterality Date    UT OPEN TX RADIAL & ULNAR SHAFT FX FIX RADIUS AND ULNA Left 3/3/2020    Procedure: OPEN REDUCTION W/ INTERNAL FIXATION (ORIF) RADIUS / ULNA (WRIST) Left;  Surgeon: Jimy Matthews MD;  Location: [facility];  Service: Orthopedics       FAMILY HISTORY:  History reviewed  No pertinent family history  SOCIAL HISTORY:  Social History     Tobacco Use    Smoking status: Current Every Day Smoker     Packs/day: 0 50     Types: Cigarettes    Smokeless tobacco: Never Used   Substance Use Topics    Alcohol use:  Yes     Alcohol/week: 12 0 standard drinks     Types: 12 Cans of beer per week     Comment: Occasionally     Drug use: Never       MEDICATIONS:    Current Outpatient Medications:     acetaminophen (TYLENOL) 500 mg tablet, Take 500 mg by mouth once, Disp: , Rfl:     albuterol (PROVENTIL HFA,VENTOLIN HFA) 90 mcg/act inhaler, Inhale 2 puffs every 6 (six) hours as needed for wheezing or shortness of breath, Disp: 3 Inhaler, Rfl: 3    ibuprofen (MOTRIN) 600 mg tablet, Take 1 tablet (600 mg total) by mouth every 6 (six) hours as needed for mild pain, Disp: 30 tablet, Rfl: 0    ALLERGIES:  No Known Allergies    REVIEW OF SYSTEMS:  Review of Systems  ROS:   General: no fever, no chills  HEENT:  No loss of hearing or eyesight problems  Eyes:  No red eyes  Respiratory:  No coughing, shortness of breath or wheezing  Cardiovascular:  No chest pain, no palpitations  GI:  Abdomen soft nontender, denies nausea  Endocrine:  No muscle weakness, no frequent urination, no excessive thirst  Urinary:  No dysuria, no incontinence  Musculoskeletal: see HPI and PE  SKIN:  No skin rash, no dry skin  Neurological:  No headaches, no confusion  Psychiatric:  No suicide thoughts, no anxiety, no depression  Review of all other systems is negative    VITALS:  Vitals:    03/17/20 1449   BP: 148/93   Pulse: 71       LABS:  HgA1c: No results found for: HGBA1C  BMP:   Lab Results   Component Value Date    CALCIUM 8 8 03/02/2020    K 4 2 03/02/2020    CO2 29 03/02/2020     (H) 03/02/2020    BUN 15 03/02/2020    CREATININE 0 81 03/02/2020       _____________________________________________________  PHYSICAL EXAMINATION:  General: well developed and well nourished, alert, oriented times 3 and appears comfortable  Psychiatric: Normal  HEENT: Trachea Midline, No torticollis  Pulmonary: No audible wheezing or respiratory distress   Skin: No masses, erythema, lacerations, fluctation, ulcerations  Neurovascular: Sensation Intact to the Median, Ulnar, Radial Nerve, Motor Intact to the Median, Ulnar, Radial Nerve and Pulses Intact    MUSCULOSKELETAL EXAMINATION:  Left wrist  Mild erythema noted around the incision, no edema or ecchymosis noted  No tenderness to palpation over the fracture site  He has decreased range of motion with wrist flexion and extension  No pain with axial loading  Patient is neurovascular intact  ___________________________________________________  STUDIES REVIEWED:  No studies reviewed         PROCEDURES PERFORMED:  Procedures  No Procedures performed today    _____________________________________________________  ASSESSMENT/PLAN:      S/P left distal radius ORIF  - it was discussed with the patient to continue to monitor the erythema over the incision as this is likely suture irritation  - if there were to be any drainage or increasing redness or streaking and he is to give us a call  - he is to continue to work with physical therapy to work on his range of motion  - he is to continue wearing his brace except for hygiene purposes  - he will follow up in 6 weeks for re-evaluation new x-rays of the left wrist        Follow Up:  Return in about 6 weeks (around 4/28/2020)      Work/school status:  No use of the left upper extremity    To Do Next Visit:  Re-evaluation of current issue and X-rays of the  left  wrist

## 2020-03-18 NOTE — TELEPHONE ENCOUNTER
Pt  Stated he was calling about his disability papers and not a work note   Pt  Stated he had spoken to Estella   Can you please verify this was taken care of  Thanks

## 2020-03-18 NOTE — TELEPHONE ENCOUNTER
Patient called and stated he is out of work completely because his employer will not allow him to work with restrictions  Can you place a work note stating patient is unable to work until cleared by physician so I can update his employer and disability company please?     Please advise

## 2020-03-19 ENCOUNTER — OFFICE VISIT (OUTPATIENT)
Dept: OCCUPATIONAL THERAPY | Facility: CLINIC | Age: 25
End: 2020-03-19
Payer: COMMERCIAL

## 2020-03-19 DIAGNOSIS — M25.532 WRIST PAIN, ACUTE, LEFT: ICD-10-CM

## 2020-03-19 DIAGNOSIS — S52.501A CLOSED FRACTURE DISTAL RADIUS AND ULNA, RIGHT, INITIAL ENCOUNTER: Primary | ICD-10-CM

## 2020-03-19 DIAGNOSIS — S52.601A CLOSED FRACTURE DISTAL RADIUS AND ULNA, RIGHT, INITIAL ENCOUNTER: Primary | ICD-10-CM

## 2020-03-19 PROCEDURE — 97140 MANUAL THERAPY 1/> REGIONS: CPT | Performed by: OCCUPATIONAL THERAPIST

## 2020-03-19 PROCEDURE — 97110 THERAPEUTIC EXERCISES: CPT | Performed by: OCCUPATIONAL THERAPIST

## 2020-03-19 NOTE — PROGRESS NOTES
Daily Note     Today's date: 3/19/2020  Patient name: Emperatriz Prado  : 1995  MRN: [id number]  Referring provider: Shannan Rosa MD  Dx:   Encounter Diagnosis     ICD-10-CM    1  Closed fracture distal radius and ulna, right, initial encounter S52 501A     S52 601A    2  Wrist pain, acute, left M25 532                   Subjective:  " it's sore  The doctor said it's not infected "      Objective: See treatment diary below      Assessment: Tolerated treatment well  Patient would benefit from continued OT  Surgical site is less red this date  Scabs visibly smaller  Patient has soreness but not pain  Patient advised to respect pain and not engage in heavy lifting  Plan: Continue per plan of care        Precautions: fracture/ universal precautions      Manual  3/5 3/10 3/13 3/16         Edema mgt  10' retrogrmassage 8' x8'         Wound care educated             AA/PROM  2' digits only 2' digits only Wrist f/e                      splint L volar wrist/to be worn when out of house                Exercise Diary  3/5 3/10 3/13 3/16         L AROM P/S  HEP   holding RFB x 15xs         L Th opposition HEP opp slides 10x opp slides 10x           L TGE"s  HEP 15x 15x          L AROM wrist HEP 15x comp fist, 15x digits in ext tenodesis 2x10 Fisted AROM wrsit f         Wrist maze              FDS/FDP diff glides   10x          Grooved pegboard   translation 1x Static  3 @ a time in /5@ a time out         LLPS wrist flex    1' 3xs                                                                                                                                                                          Modalities  3/10            MH 5'

## 2020-03-24 ENCOUNTER — APPOINTMENT (OUTPATIENT)
Dept: OCCUPATIONAL THERAPY | Facility: CLINIC | Age: 25
End: 2020-03-24
Payer: COMMERCIAL

## 2020-03-25 NOTE — TELEPHONE ENCOUNTER
I called patient to let him know I completed his FMLA Forms  He will stop by the office today to  these forms   They were scanned into his chart today under FMLA

## 2020-03-26 ENCOUNTER — APPOINTMENT (OUTPATIENT)
Dept: OCCUPATIONAL THERAPY | Facility: CLINIC | Age: 25
End: 2020-03-26
Payer: COMMERCIAL

## 2020-03-31 ENCOUNTER — APPOINTMENT (OUTPATIENT)
Dept: OCCUPATIONAL THERAPY | Facility: CLINIC | Age: 25
End: 2020-03-31
Payer: COMMERCIAL

## 2020-04-02 ENCOUNTER — APPOINTMENT (OUTPATIENT)
Dept: OCCUPATIONAL THERAPY | Facility: CLINIC | Age: 25
End: 2020-04-02
Payer: COMMERCIAL

## 2020-04-30 ENCOUNTER — APPOINTMENT (OUTPATIENT)
Dept: RADIOLOGY | Facility: CLINIC | Age: 25
End: 2020-04-30
Payer: COMMERCIAL

## 2020-04-30 VITALS
BODY MASS INDEX: 31.09 KG/M2 | HEART RATE: 88 BPM | SYSTOLIC BLOOD PRESSURE: 137 MMHG | HEIGHT: 70 IN | DIASTOLIC BLOOD PRESSURE: 85 MMHG | WEIGHT: 217.2 LBS

## 2020-04-30 DIAGNOSIS — Z48.89 AFTERCARE FOLLOWING SURGERY: ICD-10-CM

## 2020-04-30 DIAGNOSIS — Z48.89 AFTERCARE FOLLOWING SURGERY: Primary | ICD-10-CM

## 2020-04-30 PROCEDURE — 99024 POSTOP FOLLOW-UP VISIT: CPT | Performed by: ORTHOPAEDIC SURGERY

## 2020-04-30 PROCEDURE — 3008F BODY MASS INDEX DOCD: CPT | Performed by: ORTHOPAEDIC SURGERY

## 2020-04-30 PROCEDURE — 73110 X-RAY EXAM OF WRIST: CPT

## 2020-04-30 NOTE — TELEPHONE ENCOUNTER
Patient's employer will not allow him to work with restrictions      Can you place a work note stating patient is unable to work until cleared by physician so I can update his employer and disability company please?     Please advise

## 2020-05-07 ENCOUNTER — EVALUATION (OUTPATIENT)
Dept: OCCUPATIONAL THERAPY | Facility: CLINIC | Age: 25
End: 2020-05-07
Payer: COMMERCIAL

## 2020-05-07 DIAGNOSIS — Z48.89 AFTERCARE FOLLOWING SURGERY: ICD-10-CM

## 2020-05-07 PROCEDURE — 97168 OT RE-EVAL EST PLAN CARE: CPT | Performed by: OCCUPATIONAL THERAPIST

## 2020-05-07 PROCEDURE — 97530 THERAPEUTIC ACTIVITIES: CPT | Performed by: OCCUPATIONAL THERAPIST

## 2020-05-07 PROCEDURE — 97110 THERAPEUTIC EXERCISES: CPT | Performed by: OCCUPATIONAL THERAPIST

## 2020-05-13 ENCOUNTER — OFFICE VISIT (OUTPATIENT)
Dept: OCCUPATIONAL THERAPY | Facility: CLINIC | Age: 25
End: 2020-05-13
Payer: COMMERCIAL

## 2020-05-13 DIAGNOSIS — S52.602D CLOSED FRACTURE OF DISTAL ENDS OF LEFT RADIUS AND ULNA WITH ROUTINE HEALING, SUBSEQUENT ENCOUNTER: ICD-10-CM

## 2020-05-13 DIAGNOSIS — Z48.89 AFTERCARE FOLLOWING SURGERY: Primary | ICD-10-CM

## 2020-05-13 DIAGNOSIS — S52.502D CLOSED FRACTURE OF DISTAL ENDS OF LEFT RADIUS AND ULNA WITH ROUTINE HEALING, SUBSEQUENT ENCOUNTER: ICD-10-CM

## 2020-05-13 PROCEDURE — 97110 THERAPEUTIC EXERCISES: CPT | Performed by: OCCUPATIONAL THERAPIST

## 2020-05-13 PROCEDURE — 97530 THERAPEUTIC ACTIVITIES: CPT | Performed by: OCCUPATIONAL THERAPIST

## 2020-05-14 ENCOUNTER — OFFICE VISIT (OUTPATIENT)
Dept: OCCUPATIONAL THERAPY | Facility: CLINIC | Age: 25
End: 2020-05-14
Payer: COMMERCIAL

## 2020-05-14 DIAGNOSIS — S52.602D CLOSED FRACTURE OF DISTAL ENDS OF LEFT RADIUS AND ULNA WITH ROUTINE HEALING, SUBSEQUENT ENCOUNTER: ICD-10-CM

## 2020-05-14 DIAGNOSIS — Z48.89 AFTERCARE FOLLOWING SURGERY: Primary | ICD-10-CM

## 2020-05-14 DIAGNOSIS — S52.502D CLOSED FRACTURE OF DISTAL ENDS OF LEFT RADIUS AND ULNA WITH ROUTINE HEALING, SUBSEQUENT ENCOUNTER: ICD-10-CM

## 2020-05-14 PROCEDURE — 97530 THERAPEUTIC ACTIVITIES: CPT | Performed by: OCCUPATIONAL THERAPIST

## 2020-05-14 PROCEDURE — 97110 THERAPEUTIC EXERCISES: CPT | Performed by: OCCUPATIONAL THERAPIST

## 2020-05-15 ENCOUNTER — OFFICE VISIT (OUTPATIENT)
Dept: OCCUPATIONAL THERAPY | Facility: CLINIC | Age: 25
End: 2020-05-15
Payer: COMMERCIAL

## 2020-05-15 DIAGNOSIS — Z48.89 AFTERCARE FOLLOWING SURGERY: Primary | ICD-10-CM

## 2020-05-15 DIAGNOSIS — S52.602D CLOSED FRACTURE OF DISTAL ENDS OF LEFT RADIUS AND ULNA WITH ROUTINE HEALING, SUBSEQUENT ENCOUNTER: ICD-10-CM

## 2020-05-15 DIAGNOSIS — S52.502D CLOSED FRACTURE OF DISTAL ENDS OF LEFT RADIUS AND ULNA WITH ROUTINE HEALING, SUBSEQUENT ENCOUNTER: ICD-10-CM

## 2020-05-15 PROCEDURE — 97110 THERAPEUTIC EXERCISES: CPT | Performed by: OCCUPATIONAL THERAPIST

## 2020-05-15 PROCEDURE — 97530 THERAPEUTIC ACTIVITIES: CPT | Performed by: OCCUPATIONAL THERAPIST

## 2020-05-20 ENCOUNTER — OFFICE VISIT (OUTPATIENT)
Dept: OCCUPATIONAL THERAPY | Facility: CLINIC | Age: 25
End: 2020-05-20
Payer: COMMERCIAL

## 2020-05-20 DIAGNOSIS — S52.502D CLOSED FRACTURE OF DISTAL ENDS OF LEFT RADIUS AND ULNA WITH ROUTINE HEALING, SUBSEQUENT ENCOUNTER: ICD-10-CM

## 2020-05-20 DIAGNOSIS — Z48.89 AFTERCARE FOLLOWING SURGERY: Primary | ICD-10-CM

## 2020-05-20 DIAGNOSIS — S52.602D CLOSED FRACTURE OF DISTAL ENDS OF LEFT RADIUS AND ULNA WITH ROUTINE HEALING, SUBSEQUENT ENCOUNTER: ICD-10-CM

## 2020-05-20 PROCEDURE — 97110 THERAPEUTIC EXERCISES: CPT | Performed by: OCCUPATIONAL THERAPIST

## 2020-05-20 PROCEDURE — 97530 THERAPEUTIC ACTIVITIES: CPT | Performed by: OCCUPATIONAL THERAPIST

## 2020-05-21 ENCOUNTER — OFFICE VISIT (OUTPATIENT)
Dept: OCCUPATIONAL THERAPY | Facility: CLINIC | Age: 25
End: 2020-05-21
Payer: COMMERCIAL

## 2020-05-21 DIAGNOSIS — S52.602D CLOSED FRACTURE OF DISTAL ENDS OF LEFT RADIUS AND ULNA WITH ROUTINE HEALING, SUBSEQUENT ENCOUNTER: ICD-10-CM

## 2020-05-21 DIAGNOSIS — S52.502D CLOSED FRACTURE OF DISTAL ENDS OF LEFT RADIUS AND ULNA WITH ROUTINE HEALING, SUBSEQUENT ENCOUNTER: ICD-10-CM

## 2020-05-21 DIAGNOSIS — Z48.89 AFTERCARE FOLLOWING SURGERY: Primary | ICD-10-CM

## 2020-05-21 PROCEDURE — 97110 THERAPEUTIC EXERCISES: CPT | Performed by: OCCUPATIONAL THERAPIST

## 2020-05-21 PROCEDURE — 97530 THERAPEUTIC ACTIVITIES: CPT | Performed by: OCCUPATIONAL THERAPIST

## 2020-05-22 ENCOUNTER — OFFICE VISIT (OUTPATIENT)
Dept: OCCUPATIONAL THERAPY | Facility: CLINIC | Age: 25
End: 2020-05-22
Payer: COMMERCIAL

## 2020-05-22 DIAGNOSIS — S52.602D CLOSED FRACTURE OF DISTAL ENDS OF LEFT RADIUS AND ULNA WITH ROUTINE HEALING, SUBSEQUENT ENCOUNTER: ICD-10-CM

## 2020-05-22 DIAGNOSIS — S52.502D CLOSED FRACTURE OF DISTAL ENDS OF LEFT RADIUS AND ULNA WITH ROUTINE HEALING, SUBSEQUENT ENCOUNTER: ICD-10-CM

## 2020-05-22 DIAGNOSIS — Z48.89 AFTERCARE FOLLOWING SURGERY: Primary | ICD-10-CM

## 2020-05-22 PROCEDURE — 97530 THERAPEUTIC ACTIVITIES: CPT | Performed by: OCCUPATIONAL THERAPIST

## 2020-05-22 PROCEDURE — 97110 THERAPEUTIC EXERCISES: CPT | Performed by: OCCUPATIONAL THERAPIST

## 2020-05-26 ENCOUNTER — OFFICE VISIT (OUTPATIENT)
Dept: OCCUPATIONAL THERAPY | Facility: CLINIC | Age: 25
End: 2020-05-26
Payer: COMMERCIAL

## 2020-05-26 DIAGNOSIS — S52.602D CLOSED FRACTURE OF DISTAL ENDS OF LEFT RADIUS AND ULNA WITH ROUTINE HEALING, SUBSEQUENT ENCOUNTER: ICD-10-CM

## 2020-05-26 DIAGNOSIS — S52.502D CLOSED FRACTURE OF DISTAL ENDS OF LEFT RADIUS AND ULNA WITH ROUTINE HEALING, SUBSEQUENT ENCOUNTER: ICD-10-CM

## 2020-05-26 DIAGNOSIS — Z48.89 AFTERCARE FOLLOWING SURGERY: Primary | ICD-10-CM

## 2020-05-26 PROCEDURE — 97110 THERAPEUTIC EXERCISES: CPT | Performed by: OCCUPATIONAL THERAPIST

## 2020-05-26 PROCEDURE — 97530 THERAPEUTIC ACTIVITIES: CPT | Performed by: OCCUPATIONAL THERAPIST

## 2020-05-27 ENCOUNTER — OFFICE VISIT (OUTPATIENT)
Dept: OCCUPATIONAL THERAPY | Facility: CLINIC | Age: 25
End: 2020-05-27
Payer: COMMERCIAL

## 2020-05-27 DIAGNOSIS — Z48.89 AFTERCARE FOLLOWING SURGERY: Primary | ICD-10-CM

## 2020-05-27 DIAGNOSIS — S52.602D CLOSED FRACTURE OF DISTAL ENDS OF LEFT RADIUS AND ULNA WITH ROUTINE HEALING, SUBSEQUENT ENCOUNTER: ICD-10-CM

## 2020-05-27 DIAGNOSIS — S52.502D CLOSED FRACTURE OF DISTAL ENDS OF LEFT RADIUS AND ULNA WITH ROUTINE HEALING, SUBSEQUENT ENCOUNTER: ICD-10-CM

## 2020-05-27 PROCEDURE — 97110 THERAPEUTIC EXERCISES: CPT | Performed by: OCCUPATIONAL THERAPIST

## 2020-05-27 PROCEDURE — 97530 THERAPEUTIC ACTIVITIES: CPT | Performed by: OCCUPATIONAL THERAPIST

## 2020-05-28 ENCOUNTER — APPOINTMENT (OUTPATIENT)
Dept: RADIOLOGY | Facility: CLINIC | Age: 25
End: 2020-05-28
Payer: COMMERCIAL

## 2020-05-28 ENCOUNTER — APPOINTMENT (OUTPATIENT)
Dept: OCCUPATIONAL THERAPY | Facility: CLINIC | Age: 25
End: 2020-05-28
Payer: COMMERCIAL

## 2020-05-28 VITALS
HEART RATE: 70 BPM | DIASTOLIC BLOOD PRESSURE: 84 MMHG | SYSTOLIC BLOOD PRESSURE: 139 MMHG | WEIGHT: 217.2 LBS | HEIGHT: 70 IN | BODY MASS INDEX: 31.09 KG/M2

## 2020-05-28 DIAGNOSIS — Z48.89 AFTERCARE FOLLOWING SURGERY: ICD-10-CM

## 2020-05-28 DIAGNOSIS — Z48.89 AFTERCARE FOLLOWING SURGERY: Primary | ICD-10-CM

## 2020-05-28 PROCEDURE — 99024 POSTOP FOLLOW-UP VISIT: CPT | Performed by: ORTHOPAEDIC SURGERY

## 2020-05-28 PROCEDURE — 3008F BODY MASS INDEX DOCD: CPT | Performed by: ORTHOPAEDIC SURGERY

## 2020-05-28 PROCEDURE — 73110 X-RAY EXAM OF WRIST: CPT

## 2020-05-29 ENCOUNTER — APPOINTMENT (OUTPATIENT)
Dept: OCCUPATIONAL THERAPY | Facility: CLINIC | Age: 25
End: 2020-05-29
Payer: COMMERCIAL

## 2020-12-03 ENCOUNTER — TELEPHONE (OUTPATIENT)
Dept: FAMILY MEDICINE CLINIC | Facility: CLINIC | Age: 25
End: 2020-12-03

## 2020-12-03 ENCOUNTER — TELEPHONE (OUTPATIENT)
Dept: URGENT CARE | Facility: CLINIC | Age: 25
End: 2020-12-03

## 2020-12-03 ENCOUNTER — TELEMEDICINE (OUTPATIENT)
Dept: FAMILY MEDICINE CLINIC | Facility: CLINIC | Age: 25
End: 2020-12-03
Payer: COMMERCIAL

## 2020-12-03 VITALS — HEIGHT: 70 IN | BODY MASS INDEX: 31.07 KG/M2 | WEIGHT: 217 LBS

## 2020-12-03 DIAGNOSIS — Z20.822 FEVER WITH EXPOSURE TO COVID-19 VIRUS: Primary | ICD-10-CM

## 2020-12-03 DIAGNOSIS — R50.9 FEVER WITH EXPOSURE TO COVID-19 VIRUS: Primary | ICD-10-CM

## 2020-12-03 DIAGNOSIS — R50.9 FEVER WITH EXPOSURE TO COVID-19 VIRUS: ICD-10-CM

## 2020-12-03 DIAGNOSIS — Z20.822 FEVER WITH EXPOSURE TO COVID-19 VIRUS: ICD-10-CM

## 2020-12-03 PROCEDURE — 3725F SCREEN DEPRESSION PERFORMED: CPT | Performed by: FAMILY MEDICINE

## 2020-12-03 PROCEDURE — 3008F BODY MASS INDEX DOCD: CPT | Performed by: FAMILY MEDICINE

## 2020-12-03 PROCEDURE — 99214 OFFICE O/P EST MOD 30 MIN: CPT | Performed by: FAMILY MEDICINE

## 2020-12-03 PROCEDURE — U0003 INFECTIOUS AGENT DETECTION BY NUCLEIC ACID (DNA OR RNA); SEVERE ACUTE RESPIRATORY SYNDROME CORONAVIRUS 2 (SARS-COV-2) (CORONAVIRUS DISEASE [COVID-19]), AMPLIFIED PROBE TECHNIQUE, MAKING USE OF HIGH THROUGHPUT TECHNOLOGIES AS DESCRIBED BY CMS-2020-01-R: HCPCS | Performed by: FAMILY MEDICINE

## 2020-12-03 PROCEDURE — 4004F PT TOBACCO SCREEN RCVD TLK: CPT | Performed by: FAMILY MEDICINE

## 2020-12-04 LAB — SARS-COV-2 RNA SPEC QL NAA+PROBE: DETECTED

## 2024-08-27 NOTE — INTERVAL H&P NOTE
H&P reviewed  After examining the patient I find no changes in the patients condition since the H&P had been written      Vitals:    03/03/20 1151   BP: 136/84   Pulse: 78   Resp: 20   Temp: (!) 96 7 °F (35 9 °C)   SpO2: 99% Quality 130: Documentation Of Current Medications In The Medical Record: Current Medications Documented Detail Level: Detailed Quality 226: Preventive Care And Screening: Tobacco Use: Screening And Cessation Intervention: Patient screened for tobacco use and is an ex/non-smoker

## 2024-09-19 ENCOUNTER — APPOINTMENT (OUTPATIENT)
Dept: URGENT CARE | Facility: CLINIC | Age: 29
End: 2024-09-19
